# Patient Record
Sex: MALE | Race: WHITE | NOT HISPANIC OR LATINO | URBAN - METROPOLITAN AREA
[De-identification: names, ages, dates, MRNs, and addresses within clinical notes are randomized per-mention and may not be internally consistent; named-entity substitution may affect disease eponyms.]

---

## 2018-03-31 ENCOUNTER — EMERGENCY (EMERGENCY)
Facility: HOSPITAL | Age: 83
LOS: 1 days | End: 2018-03-31
Payer: MEDICARE

## 2018-03-31 PROCEDURE — 71045 X-RAY EXAM CHEST 1 VIEW: CPT | Mod: 26

## 2018-03-31 PROCEDURE — 70450 CT HEAD/BRAIN W/O DYE: CPT | Mod: 26

## 2018-03-31 PROCEDURE — 99285 EMERGENCY DEPT VISIT HI MDM: CPT

## 2018-06-08 ENCOUNTER — EMERGENCY (EMERGENCY)
Facility: HOSPITAL | Age: 83
LOS: 1 days | End: 2018-06-08
Payer: MEDICARE

## 2018-06-08 PROCEDURE — 99284 EMERGENCY DEPT VISIT MOD MDM: CPT

## 2018-06-08 PROCEDURE — 72131 CT LUMBAR SPINE W/O DYE: CPT | Mod: 26

## 2019-02-05 ENCOUNTER — INPATIENT (INPATIENT)
Facility: HOSPITAL | Age: 84
LOS: 4 days | Discharge: HOME | End: 2019-02-10
Attending: NEUROLOGICAL SURGERY | Admitting: NEUROLOGICAL SURGERY
Payer: MEDICARE

## 2019-02-05 VITALS
HEART RATE: 89 BPM | WEIGHT: 166.01 LBS | HEIGHT: 60.6 IN | DIASTOLIC BLOOD PRESSURE: 83 MMHG | OXYGEN SATURATION: 97 % | TEMPERATURE: 97 F | RESPIRATION RATE: 17 BRPM | SYSTOLIC BLOOD PRESSURE: 120 MMHG

## 2019-02-05 DIAGNOSIS — I10 ESSENTIAL (PRIMARY) HYPERTENSION: ICD-10-CM

## 2019-02-05 DIAGNOSIS — S06.5X9A TRAUMATIC SUBDURAL HEMORRHAGE WITH LOSS OF CONSCIOUSNESS OF UNSPECIFIED DURATION, INITIAL ENCOUNTER: ICD-10-CM

## 2019-02-05 DIAGNOSIS — M10.9 GOUT, UNSPECIFIED: ICD-10-CM

## 2019-02-05 DIAGNOSIS — N40.0 BENIGN PROSTATIC HYPERPLASIA WITHOUT LOWER URINARY TRACT SYMPTOMS: ICD-10-CM

## 2019-02-05 DIAGNOSIS — S06.5X0A TRAUMATIC SUBDURAL HEMORRHAGE WITHOUT LOSS OF CONSCIOUSNESS, INITIAL ENCOUNTER: ICD-10-CM

## 2019-02-05 DIAGNOSIS — Z96.649 PRESENCE OF UNSPECIFIED ARTIFICIAL HIP JOINT: Chronic | ICD-10-CM

## 2019-02-05 PROBLEM — Z00.00 ENCOUNTER FOR PREVENTIVE HEALTH EXAMINATION: Status: ACTIVE | Noted: 2019-02-05

## 2019-02-05 PROCEDURE — 93010 ELECTROCARDIOGRAM REPORT: CPT

## 2019-02-05 RX ORDER — TAMSULOSIN HYDROCHLORIDE 0.4 MG/1
0.4 CAPSULE ORAL AT BEDTIME
Qty: 0 | Refills: 0 | Status: DISCONTINUED | OUTPATIENT
Start: 2019-02-05 | End: 2019-02-10

## 2019-02-05 RX ORDER — ALLOPURINOL 300 MG
1 TABLET ORAL
Qty: 0 | Refills: 0 | COMMUNITY

## 2019-02-05 RX ORDER — LEVETIRACETAM 250 MG/1
500 TABLET, FILM COATED ORAL
Qty: 0 | Refills: 0 | Status: DISCONTINUED | OUTPATIENT
Start: 2019-02-05 | End: 2019-02-10

## 2019-02-05 RX ORDER — ALLOPURINOL 300 MG
300 TABLET ORAL DAILY
Qty: 0 | Refills: 0 | Status: DISCONTINUED | OUTPATIENT
Start: 2019-02-05 | End: 2019-02-10

## 2019-02-05 RX ORDER — ACETAMINOPHEN 500 MG
650 TABLET ORAL EVERY 6 HOURS
Qty: 0 | Refills: 0 | Status: DISCONTINUED | OUTPATIENT
Start: 2019-02-05 | End: 2019-02-07

## 2019-02-05 RX ORDER — AMLODIPINE BESYLATE 2.5 MG/1
5 TABLET ORAL DAILY
Qty: 0 | Refills: 0 | Status: DISCONTINUED | OUTPATIENT
Start: 2019-02-05 | End: 2019-02-05

## 2019-02-05 RX ORDER — AMLODIPINE BESYLATE 2.5 MG/1
1 TABLET ORAL
Qty: 0 | Refills: 0 | COMMUNITY

## 2019-02-05 RX ORDER — TAMSULOSIN HYDROCHLORIDE 0.4 MG/1
1 CAPSULE ORAL
Qty: 0 | Refills: 0 | COMMUNITY

## 2019-02-05 RX ORDER — AMLODIPINE BESYLATE 2.5 MG/1
5 TABLET ORAL DAILY
Qty: 0 | Refills: 0 | Status: DISCONTINUED | OUTPATIENT
Start: 2019-02-05 | End: 2019-02-10

## 2019-02-05 RX ORDER — ATORVASTATIN CALCIUM 80 MG/1
80 TABLET, FILM COATED ORAL AT BEDTIME
Qty: 0 | Refills: 0 | Status: DISCONTINUED | OUTPATIENT
Start: 2019-02-05 | End: 2019-02-10

## 2019-02-05 RX ADMIN — TAMSULOSIN HYDROCHLORIDE 0.4 MILLIGRAM(S): 0.4 CAPSULE ORAL at 22:42

## 2019-02-05 RX ADMIN — LEVETIRACETAM 500 MILLIGRAM(S): 250 TABLET, FILM COATED ORAL at 22:42

## 2019-02-05 NOTE — H&P ADULT - REASON FOR ADMISSION
Transfer from [facility] for subacute on chronic subdural hemtoma s/p fall 1 week ago Transfer from [facility] for subacute on chronic subdural hematoma s/p fall 1 week ago

## 2019-02-05 NOTE — H&P ADULT - PROBLEM SELECTOR PLAN 2
1. Will restart Flomax  2. MRI Thoracic spine incidentally showed 1.4cm Bladder calculi- Will obtain Bladder US and possible Urology consult in AM 1. Will restart Flomax  2. MRI Thoracic spine incidentally showed 1.4cm Bladder calculi- Per family and patient- this has been known about for years. Last Urology visit was last week who recommended follow up in 1 months.

## 2019-02-05 NOTE — H&P ADULT - PMH
BPH (benign prostatic hyperplasia)    Essential hypertension    Gout    Hyperlipidemia    Lymphoma in remission  Tonsillar large cell lymphoma in remission since 2015. [place] [facility] [place]

## 2019-02-05 NOTE — PATIENT PROFILE ADULT - NSPROMEDSPATCH_GEN_A_NUR
medication patch(es) used/Pt adm from [facility] with Lidoderm patch on R-shoulder

## 2019-02-05 NOTE — H&P ADULT - NSHPLABSRESULTS_GEN_ALL_CORE
[facility]ert[facility]:  Uric Acid 6.6 (normal)  Platelets 237 (Normal)      [facility]:    CT Cervical spine: Multilevel degenerative changes, formainal narrowing @ C5/6, C6/7, No definite fracture. Prominent right thyroid    Right Shoulder Xray: Calcification superior to Acromioclavicular joint possible Bursitis, No fracture or dislocation    CTH- see above    MRI brain: Right sided subdural hematoma  2.0cm, 3.6mm Leftward shift, sulcal effacement on the right  No infarct  Pansinusitis- Extensive maxillary sinus opacification, Left frontal sinus opacification    MRI Lumbar spine: Multilevel disc disease post prominent at L3-4, L4-5    MRI Thoracic spine: No fracture, Incidental signal intensity within bladder, suggestive   of bladder calculus with diameter of 1.8cm    MRI Cervical spine: Flattening of thecal sac @ C4-5 with L sided foraminal stenosis

## 2019-02-05 NOTE — H&P ADULT - ASSESSMENT
83 year old Male s/p recent fall with subacute on chronic right sided subdural hematoma with 3.6mm shift on imaging from [facility]

## 2019-02-05 NOTE — H&P ADULT - PROBLEM SELECTOR PLAN 1
1. Neuro checks q 4 hours  2. Will upload all CT and MRI imaging  3. Will allow diet for now 1. Neuro checks q 4 hours  2. Will upload all CT and MRI imaging- Will review MRI cervical spine with neuro rads given myelomalacia  3. Will allow diet for now  4.

## 2019-02-05 NOTE — H&P ADULT - HISTORY OF PRESENT ILLNESS
83 year old Male with medical history of OA, BPH, Gout, HTN, HLD, Tonsilar large cell lymphoma (Treated with chemotherapy @ [place] and in remission since 2015), R hip replacement, presented to [facility] on 2/4 complaining of unsteady gait after falling about 1 week ago.     Around 1 month ago, patient was having difficulty walking with gait wide based. Patient has had 2 falls in the last month, most recent was one week ago while walking to his car. Patient does not remember if he hit his head. During this fall, he injured his right arm and right side of the neck. [facility].     Of note, patient recently stopped his Lipitor (80mg)    [facility] Cervical spine negative for fracture, CT head demonstrated Right frontoparietal subdural collection with subacute to chronic hemorrhagic products measuring 2.2cm with 3.6cm of midline shift. Neurosurgery contacted at [facility] who suggested admission to ICU for neuro checks q 1 hour and possibly transfer to [facility] on 2/5. Patient was referred to Dr. Moses for consulted who [facility]    Patient denies taking Aspirin or any other blood thinner 83 year old Male with medical history of OA, BPH, Gout, HTN, HLD, Tonsilar large cell lymphoma (Treated with chemotherapy @ [place] and in remission since 2015), R hip replacement, presented to [facility] on  complaining of unsteady gait after falling about 1 week ago.     Around 1 month ago, patient was having difficulty walking with gait wide based. Patient has had 2 falls in the last month, most recent was one week ago while walking to his car. Patient does not remember if he hit his head. During this fall, he injured his right arm and right side of the neck. Patient presented to [facility] for the shoulder pain and neck pain.     Of note, patient recently stopped his Lipitor (80mg)    In [facility] ER. CT Cervical spine negative for fracture, CT head demonstrated Right frontoparietal subdural collection with subacute to chronic hemorrhagic products measuring 2.2cm with 3.6cm of midline shift. Neurosurgery contacted at [facility] who suggested admission to ICU for neuro checks q 1 hour and possibly transfer to [facility] on . Patient was referred to Dr. Moses for consulted who accepted direct admit to [facility]    Patient denies taking Aspirin or any other blood thinner    CAPRINI SCORE [CLOT]    AGE RELATED RISK FACTORS                                                       MOBILITY RELATED FACTORS  [ ] Age 41-60 years                                            (1 Point)                  [ ] Bed rest                                                        (1 Point)  [ ] Age: 61-74 years                                           (2 Points)                 [ ] Plaster cast                                                   (2 Points)  [x ] Age= 75 years                                              (3 Points)                 [ ] Bed bound for more than 72 hours                 (2 Points)    DISEASE RELATED RISK FACTORS                                               GENDER SPECIFIC FACTORS  [ ] Edema in the lower extremities                       (1 Point)                  [ ] Pregnancy                                                     (1 Point)  [ ] Varicose veins                                               (1 Point)                  [ ] Post-partum < 6 weeks                                   (1 Point)             [ x] BMI > 25 Kg/m2                                            (1 Point)                  [ ] Hormonal therapy  or oral contraception          (1 Point)                 [ ] Sepsis (in the previous month)                        (1 Point)                  [ ] History of pregnancy complications                 (1 point)  [ ] Pneumonia or serious lung disease                                               [ ] Unexplained or recurrent                     (1 Point)           (in the previous month)                               (1 Point)  [ ] Abnormal pulmonary function test                     (1 Point)                 SURGERY RELATED RISK FACTORS  [ ] Acute myocardial infarction                              (1 Point)                 [ ]  Section                                             (1 Point)  [ ] Congestive heart failure (in the previous month)  (1 Point)               [ ] Minor surgery                                                  (1 Point)   [ ] Inflammatory bowel disease                             (1 Point)                 [ ] Arthroscopic surgery                                        (2 Points)  [ ] Central venous access                                      (2 Points)                [ ] General surgery lasting more than 45 minutes   (2 Points)       [ ] Stroke (in the previous month)                          (5 Points)               [ ] Elective arthroplasty                                         (5 Points)                                                                                                                                               HEMATOLOGY RELATED FACTORS                                                 TRAUMA RELATED RISK FACTORS  [ ] Prior episodes of VTE                                     (3 Points)                [ ] Fracture of the hip, pelvis, or leg                       (5 Points)  [ ] Positive family history for VTE                         (3 Points)                 [ ] Acute spinal cord injury (in the previous month)  (5 Points)  [ ] Prothrombin 99187 A                                     (3 Points)                 [ ] Paralysis  (less than 1 month)                             (5 Points)  [ ] Factor V Leiden                                             (3 Points)                  [ ] Multiple Trauma within 1 month                        (5 Points)  [ ] Lupus anticoagulants                                     (3 Points)                                                           [ ] Anticardiolipin antibodies                               (3 Points)                                                       [ ] High homocysteine in the blood                      (3 Points)                                             [ ] Other congenital or acquired thrombophilia      (3 Points)                                                [ ] Heparin induced thrombocytopenia                  (3 Points)                                          Total Score [      4    ]

## 2019-02-05 NOTE — PATIENT PROFILE ADULT - ABILITY TO HEAR (WITH HEARING AID OR HEARING APPLIANCE IF NORMALLY USED):
NO hearing aid/Mildly to Moderately Impaired: difficulty hearing in some environments or speaker may need to increase volume or speak distinctly

## 2019-02-05 NOTE — H&P ADULT - NSHPPHYSICALEXAM_GEN_ALL_CORE
Awake Alert Oriented x 3  PERRL  Head: Normal cephalic Atraumatic  EOMI, Tongue midline  Motor 5/5 in all extremities  Sensation intact  No Clonus  No lowery's  No babinski Awake Alert Oriented x 3  PERRL  Head: Normal cephalic Atraumatic  EOMI, Tongue midline  Motor 5/5 in all extremities except RUE deltoid limited due to shoulder pain  Sensation intact  No Clonus  No lowery's  No babinski  Hyperreflexic on

## 2019-02-06 ENCOUNTER — TRANSCRIPTION ENCOUNTER (OUTPATIENT)
Age: 84
End: 2019-02-06

## 2019-02-06 DIAGNOSIS — E78.5 HYPERLIPIDEMIA, UNSPECIFIED: ICD-10-CM

## 2019-02-06 DIAGNOSIS — Z29.9 ENCOUNTER FOR PROPHYLACTIC MEASURES, UNSPECIFIED: ICD-10-CM

## 2019-02-06 DIAGNOSIS — Z01.818 ENCOUNTER FOR OTHER PREPROCEDURAL EXAMINATION: ICD-10-CM

## 2019-02-06 DIAGNOSIS — C85.90 NON-HODGKIN LYMPHOMA, UNSPECIFIED, UNSPECIFIED SITE: ICD-10-CM

## 2019-02-06 LAB
ANION GAP SERPL CALC-SCNC: 14 MMO/L — SIGNIFICANT CHANGE UP (ref 7–14)
APTT BLD: 31.7 SEC — SIGNIFICANT CHANGE UP (ref 27.5–36.3)
BLD GP AB SCN SERPL QL: NEGATIVE — SIGNIFICANT CHANGE UP
BUN SERPL-MCNC: 17 MG/DL — SIGNIFICANT CHANGE UP (ref 7–23)
CALCIUM SERPL-MCNC: 9.5 MG/DL — SIGNIFICANT CHANGE UP (ref 8.4–10.5)
CHLORIDE SERPL-SCNC: 103 MMOL/L — SIGNIFICANT CHANGE UP (ref 98–107)
CO2 SERPL-SCNC: 22 MMOL/L — SIGNIFICANT CHANGE UP (ref 22–31)
CREAT SERPL-MCNC: 1.31 MG/DL — HIGH (ref 0.5–1.3)
GLUCOSE SERPL-MCNC: 132 MG/DL — HIGH (ref 70–99)
HCT VFR BLD CALC: 44.7 % — SIGNIFICANT CHANGE UP (ref 39–50)
HGB BLD-MCNC: 15.3 G/DL — SIGNIFICANT CHANGE UP (ref 13–17)
INR BLD: 0.98 — SIGNIFICANT CHANGE UP (ref 0.88–1.17)
MCHC RBC-ENTMCNC: 31 PG — SIGNIFICANT CHANGE UP (ref 27–34)
MCHC RBC-ENTMCNC: 34.2 % — SIGNIFICANT CHANGE UP (ref 32–36)
MCV RBC AUTO: 90.7 FL — SIGNIFICANT CHANGE UP (ref 80–100)
NRBC # FLD: 0 K/UL — LOW (ref 25–125)
PLATELET # BLD AUTO: 223 K/UL — SIGNIFICANT CHANGE UP (ref 150–400)
PMV BLD: 10.1 FL — SIGNIFICANT CHANGE UP (ref 7–13)
POTASSIUM SERPL-MCNC: 3.9 MMOL/L — SIGNIFICANT CHANGE UP (ref 3.5–5.3)
POTASSIUM SERPL-SCNC: 3.9 MMOL/L — SIGNIFICANT CHANGE UP (ref 3.5–5.3)
PROTHROM AB SERPL-ACNC: 10.9 SEC — SIGNIFICANT CHANGE UP (ref 9.8–13.1)
RBC # BLD: 4.93 M/UL — SIGNIFICANT CHANGE UP (ref 4.2–5.8)
RBC # FLD: 12.9 % — SIGNIFICANT CHANGE UP (ref 10.3–14.5)
RH IG SCN BLD-IMP: NEGATIVE — SIGNIFICANT CHANGE UP
RH IG SCN BLD-IMP: NEGATIVE — SIGNIFICANT CHANGE UP
SODIUM SERPL-SCNC: 139 MMOL/L — SIGNIFICANT CHANGE UP (ref 135–145)
URATE SERPL-MCNC: 6.6 MG/DL — SIGNIFICANT CHANGE UP (ref 3.4–8.8)
WBC # BLD: 7.45 K/UL — SIGNIFICANT CHANGE UP (ref 3.8–10.5)
WBC # FLD AUTO: 7.45 K/UL — SIGNIFICANT CHANGE UP (ref 3.8–10.5)

## 2019-02-06 PROCEDURE — 99223 1ST HOSP IP/OBS HIGH 75: CPT

## 2019-02-06 PROCEDURE — 71045 X-RAY EXAM CHEST 1 VIEW: CPT | Mod: 26

## 2019-02-06 PROCEDURE — 93010 ELECTROCARDIOGRAM REPORT: CPT

## 2019-02-06 PROCEDURE — 70450 CT HEAD/BRAIN W/O DYE: CPT | Mod: 26

## 2019-02-06 RX ADMIN — Medication 650 MILLIGRAM(S): at 05:39

## 2019-02-06 RX ADMIN — LEVETIRACETAM 500 MILLIGRAM(S): 250 TABLET, FILM COATED ORAL at 05:39

## 2019-02-06 RX ADMIN — Medication 300 MILLIGRAM(S): at 15:09

## 2019-02-06 RX ADMIN — TAMSULOSIN HYDROCHLORIDE 0.4 MILLIGRAM(S): 0.4 CAPSULE ORAL at 21:10

## 2019-02-06 RX ADMIN — LEVETIRACETAM 500 MILLIGRAM(S): 250 TABLET, FILM COATED ORAL at 18:08

## 2019-02-06 NOTE — PROGRESS NOTE ADULT - SUBJECTIVE AND OBJECTIVE BOX
Surgery: right sided diallo hole for evacuation of SDH, possible craniotomy   Consent to be signed by patient                    Alleriges: Motrin (Rash)    T(C): 36.4 (02-06-19 @ 05:33), Max: 36.8 (02-06-19 @ 01:12)  HR: 74 (02-06-19 @ 05:33) (74 - 89)  BP: 109/50 (02-06-19 @ 05:33) (109/50 - 133/69)  RR: 17 (02-06-19 @ 05:33) (17 - 18)  SpO2: 97% (02-06-19 @ 05:33) (97% - 97%)  Wt(kg): --    AAOx3, MAEx4, unsteady gait, no drift, EOMI, PERRL    139  |  103  |  17  ----------------------------<  132<H>  3.9   |  22  |  1.31<H>    Ca    9.5      06 Feb 2019 05:30    CBC Full  -  ( 06 Feb 2019 05:30 )  WBC Count : 7.45 K/uL  Hemoglobin : 15.3 g/dL  Hematocrit : 44.7 %  Platelet Count - Automated : 223 K/uL  Mean Cell Volume : 90.7 fL  Mean Cell Hemoglobin : 31.0 pg  Mean Cell Hemoglobin Concentration : 34.2 %              PT/INR - ( 06 Feb 2019 05:30 )   PT: 10.9 SEC;   INR: 0.98     PTT - ( 06 Feb 2019 05:30 )  PTT:31.7 SEC    Other Clearances:   pending medical clearance

## 2019-02-06 NOTE — CONSULT NOTE ADULT - PROBLEM SELECTOR RECOMMENDATION 9
Patient with RBBB on EKG but old [facility] on [facility], no signs of active ischemia, CP, SOB, or decompensated CHF  -RCRI score=0  -Patient is medically optimized and may proceed to OR without further work-up  -Would consider anesthesia evaluation prior to surgery given patient's need for NGT decompression post-op and significant agitation post-op to determine if general anesthesia is required

## 2019-02-06 NOTE — CONSULT NOTE ADULT - ASSESSMENT
84 yo M with HTN, HLD, tonsilar large cell lymphoma (Treated with chemotherapy @ [place] and in remission since 2015), R hip replacement, gout, OA [facility] complaining of unsteady gait after falling about 1 week ago and subsequently found to have a right frontal parietal subdural hematoma with right to left midline shift with plan for Berince hole in AM.

## 2019-02-06 NOTE — PROGRESS NOTE ADULT - ASSESSMENT
83 year old M s/p fall 1 week ago p/w unsteady gait, Right shoulder pain, Transferred to [facility] after CT scan demonstrated Right subacute on chronic subdural hematoma and MRI cervical spine with myelomalacia

## 2019-02-06 NOTE — CONSULT NOTE ADULT - SUBJECTIVE AND OBJECTIVE BOX
CHIEF COMPLAINT: Patient is a 83y old  Male who presents with a chief complaint of Transfer from [facility] for subacute on chronic subdural hematoma s/p fall 1 week ago (06 Feb 2019 02:22)      HPI: HPI:  83 year old Male with medical history of OA, BPH, Gout, HTN, HLD, Tonsilar large cell lymphoma (Treated with chemotherapy @ [place] and in remission since 2015), R hip replacement, presented to [facility] on 2/4 complaining of unsteady gait after falling about 1 week ago.     Around 1 month ago, patient was having difficulty walking with gait wide based. Patient has had 2 falls in the last month, most recent was one week ago while walking to his car. Patient does not remember if he hit his head. During this fall, he injured his right arm and right side of the neck. Patient presented to [facility] for the shoulder pain and neck pain.     Of note, patient recently stopped his Lipitor (80mg)    [facility] Cervical spine negative for fracture, CT head demonstrated Right frontoparietal subdural collection with subacute to chronic hemorrhagic products measuring 2.2cm with 3.6cm of midline shift. Neurosurgery contacted at [facility] who suggested admission to ICU for neuro checks q 1 hour and possibly transfer to [facility] on 2/5. Patient was referred to Dr. Moses for consulted who accepted direct admit to [facility]    Patient denies taking Aspirin or any other blood thinner  -----------------------------------------------------------------------------    Additional history obtained from patient, wife, and son at bedside. Per family, prior to patient's difficulty with ambulation, he was a very active individual, "always running around, climbing ladders, always doing something." He reports he was able to climb a ladder and a flight of steps without difficulty and no CP, SOB, palpitations, CORTES. He reports that approximately 20 years ago, there was concern for an ischemic cardiac event at work, he was brought to [place], cardiac enzymes were negative, and cardiac cath was completely normal. Since then, he has had no issues with his heart.    Of note, patient underwent hip replacement surgery approximately 10 years ago. There was no difficulty removing him from the vent, but patient reportedly was very agitated after general anesthesia and his abdomen was found to be grossly distended immediately post-op requiring NGT decompression for approximately one day. Of note, 10 days post-operatively, the patient reports that when he tried to get out of bed, he had an episode of vasovagal syncope. No cardiac etiology was found. Patient does have a history of HTN and HLD. I [facility] who reviewed his EKG (and will send a copy) revealing an old RBBB. At present, patient has no CP, SOB, palpitations, dizziness, LE edema.    Allergies: Motrin (Rash), Aspirin (Rash)  Intolerances:        HOME MEDICATIONS: [x] Reviewed    MEDICATIONS  (STANDING):  allopurinol 300 milliGRAM(s) Oral daily  amLODIPine   Tablet 5 milliGRAM(s) Oral daily  atorvastatin 80 milliGRAM(s) Oral at bedtime  levETIRAcetam 500 milliGRAM(s) Oral two times a day  tamsulosin 0.4 milliGRAM(s) Oral at bedtime    MEDICATIONS  (PRN):  acetaminophen   Tablet .. 650 milliGRAM(s) Oral every 6 hours PRN Mild Pain (1 - 3)      PAST MEDICAL & SURGICAL HISTORY:  Subdural hematoma  Lymphoma in remission: Tonsillar large cell lymphoma in remission since 2015. Treatment @ [facility]  BPH (benign prostatic hyperplasia)  Bladder stones  Gout  Hyperlipidemia  Essential hypertension  History of hip replacement  [x] Reviewed     SOCIAL HISTORY:  [x] Substance abuse: Denies  [x] Tobacco: Quit 50 yrs ago, 0.25 cigarettes/day x ~5 years  [x] Alcohol use: Occasional glass of wine weekly    FAMILY HISTORY:  [x] No pertinent family history in first degree relatives     REVIEW OF SYSTEMS:    [x] All other ROS negative  [  ] Unable to obtain due to poor mental status    Vital Signs Last 24 Hrs  T(C): 36.3 (06 Feb 2019 10:27), Max: 36.8 (06 Feb 2019 01:12)  T(F): 97.4 (06 Feb 2019 10:27), Max: 98.2 (06 Feb 2019 01:12)  HR: 62 (06 Feb 2019 10:27) (62 - 89)  BP: 106/56 (06 Feb 2019 10:27) (106/56 - 133/69)  BP(mean): --  RR: 17 (06 Feb 2019 10:27) (17 - 18)  SpO2: 96% (06 Feb 2019 10:27) (96% - 97%)    PHYSICAL EXAM:    GENERAL: NAD, well-groomed, well-developed  HEAD:  Atraumatic, Normocephalic  EYES:  PERRLA, conjunctiva and sclera clear  ENMT: Moist mucous membranes  NECK: Supple, No JVD  RESPIRATORY: Clear to auscultation bilaterally; No rales, rhonchi, wheezing  CARDIOVASCULAR: Regular rate and rhythm; No murmurs,   GASTROINTESTINAL: Soft, Nontender, Nondistended; Bowel sounds present  GENITOURINARY: Not examined  EXTREMITIES:  2+ Peripheral Pulses, No clubbing, cyanosis, or edema  NERVOUS SYSTEM:  Alert & Oriented X3; Moving all 4 extremities; No gross sensory deficits  HEME/LYMPH: No lymphadenopathy noted  SKIN: Nasal lesion    LABS:                        15.3   7.45  )-----------( 223      ( 06 Feb 2019 05:30 )             44.7     02-06    139  |  103  |  17  ----------------------------<  132<H>  3.9   |  22  |  1.31<H>    Ca    9.5      06 Feb 2019 05:30    [facility] records: Cr was 1.3 on 2/4/19 and 1.2 in 10/18    PT/INR - ( 06 Feb 2019 05:30 )   PT: 10.9 SEC;   INR: 0.98          PTT - ( 06 Feb 2019 05:30 )  PTT:31.7 SEC    CAPILLARY BLOOD GLUCOSE          RADIOLOGY & ADDITIONAL STUDIES:    EKG:   Personally Reviewed:  [x] YES   NSR, RBBB noted (old per discussion with [facility]), no acute ischemic changes appreciated      Imaging:   Personally Reviewed:  [x] YES   < from: CT Head No Cont (02.06.19 @ 11:18) >  IMPRESSION:    The right frontal parietal subdural hematoma and associated right to left   midline shift are stable compared to the outside head CT study from   02/04/2019.    < end of copied text >                [x] Consultant(s) Notes Reviewed  [x] Care Discussed with Consultants/Other Providers: Neurosurgery PA-Plan of care CHIEF COMPLAINT: Patient is a 83y old  Male who presents with a chief complaint of Transfer from [facility] for subacute on chronic subdural hematoma s/p fall 1 week ago (06 Feb 2019 02:22)      HPI: HPI:  83 year old Male with medical history of OA, BPH, Gout, HTN, HLD, Tonsilar large cell lymphoma (Treated with chemotherapy @ [place] and in remission since 2015), R hip replacement, presented to [facility] on 2/4 complaining of unsteady gait after falling about 1 week ago.     Around 1 month ago, patient was having difficulty walking with gait wide based. Patient has had 2 falls in the last month, most recent was one week ago while walking to his car. Patient does not remember if he hit his head. During this fall, he injured his right arm and right side of the neck. Patient presented to [facility] for the shoulder pain and neck pain.     Of note, patient recently stopped his Lipitor (80mg)    [facility] Cervical spine negative for fracture, CT head demonstrated Right frontoparietal subdural collection with subacute to chronic hemorrhagic products measuring 2.2cm with 3.6cm of midline shift. Neurosurgery contacted at [facility] who suggested admission to ICU for neuro checks q 1 hour and possibly transfer to [facility] on 2/5. Patient was referred to Dr. Moses for consulted who accepted direct admit to [facility]    Patient denies taking Aspirin or any other blood thinner  -----------------------------------------------------------------------------    Additional history obtained from patient, wife, and son at bedside. Per family, prior to patient's difficulty with ambulation, he was a very active individual, "always running around, climbing ladders, always doing something." He reports he was able to climb a ladder and a flight of steps without difficulty and no CP, SOB, palpitations, CORTES. He reports that approximately 20 years ago, there was concern for an ischemic cardiac event at work, he was brought to [place], cardiac enzymes were negative, and cardiac cath was completely normal. Since then, he has had no issues with his heart.    Of note, patient underwent hip replacement surgery approximately 10 years ago. There was no difficulty removing him from the vent, but patient reportedly was very agitated after general anesthesia and his abdomen was found to be grossly distended immediately post-op requiring NGT decompression for approximately one day. Also of note, 10 days post-operatively, the patient reports that when he tried to get out of bed, he had an episode of vasovagal syncope. No cardiac etiology was found. Patient does have a history of HTN and HLD. I spoke with [facility] who reviewed his EKG (and will send a copy) revealing an old RBBB. At present, patient has no CP, SOB, palpitations, dizziness, LE edema.    Allergies: Motrin (Rash), Aspirin (Rash)  Intolerances:        HOME MEDICATIONS: [x] Reviewed    MEDICATIONS  (STANDING):  allopurinol 300 milliGRAM(s) Oral daily  amLODIPine   Tablet 5 milliGRAM(s) Oral daily  atorvastatin 80 milliGRAM(s) Oral at bedtime  levETIRAcetam 500 milliGRAM(s) Oral two times a day  tamsulosin 0.4 milliGRAM(s) Oral at bedtime    MEDICATIONS  (PRN):  acetaminophen   Tablet .. 650 milliGRAM(s) Oral every 6 hours PRN Mild Pain (1 - 3)      PAST MEDICAL & SURGICAL HISTORY:  Subdural hematoma  Lymphoma in remission: Tonsillar large cell lymphoma in remission since 2015. Treatment @ [facility]  BPH (benign prostatic hyperplasia)  Bladder stones  Gout  Hyperlipidemia  Essential hypertension  History of hip replacement  [x] Reviewed     SOCIAL HISTORY:  [x] Substance abuse: Denies  [x] Tobacco: Quit 50 yrs ago, 0.25 cigarettes/day x ~5 years  [x] Alcohol use: Occasional glass of wine weekly    FAMILY HISTORY:  [x] No pertinent family history in first degree relatives     REVIEW OF SYSTEMS:    [x] All other ROS negative  [  ] Unable to obtain due to poor mental status    Vital Signs Last 24 Hrs  T(C): 36.3 (06 Feb 2019 10:27), Max: 36.8 (06 Feb 2019 01:12)  T(F): 97.4 (06 Feb 2019 10:27), Max: 98.2 (06 Feb 2019 01:12)  HR: 62 (06 Feb 2019 10:27) (62 - 89)  BP: 106/56 (06 Feb 2019 10:27) (106/56 - 133/69)  BP(mean): --  RR: 17 (06 Feb 2019 10:27) (17 - 18)  SpO2: 96% (06 Feb 2019 10:27) (96% - 97%)    PHYSICAL EXAM:    GENERAL: NAD, well-groomed, well-developed  HEAD:  Atraumatic, Normocephalic  EYES:  PERRLA, conjunctiva and sclera clear  ENMT: Moist mucous membranes  NECK: Supple, No JVD  RESPIRATORY: Clear to auscultation bilaterally; No rales, rhonchi, wheezing  CARDIOVASCULAR: Regular rate and rhythm; No murmurs,   GASTROINTESTINAL: Soft, Nontender, Nondistended; Bowel sounds present  GENITOURINARY: Not examined  EXTREMITIES:  2+ Peripheral Pulses, No clubbing, cyanosis, or edema  NERVOUS SYSTEM:  Alert & Oriented X3; Moving all 4 extremities; No gross sensory deficits  HEME/LYMPH: No lymphadenopathy noted  SKIN: Nasal lesion    LABS:                        15.3   7.45  )-----------( 223      ( 06 Feb 2019 05:30 )             44.7     02-06    139  |  103  |  17  ----------------------------<  132<H>  3.9   |  22  |  1.31<H>    Ca    9.5      06 Feb 2019 05:30    [facility]: Cr was 1.3 on 2/4/19 and 1.2 in 10/18    PT/INR - ( 06 Feb 2019 05:30 )   PT: 10.9 SEC;   INR: 0.98          PTT - ( 06 Feb 2019 05:30 )  PTT:31.7 SEC    CAPILLARY BLOOD GLUCOSE          RADIOLOGY & ADDITIONAL STUDIES:    EKG:   Personally Reviewed:  [x] YES   NSR, RBBB noted (old per discussion with [facility]), no acute ischemic changes appreciated      Imaging:   Personally Reviewed:  [x] YES   < from: CT Head No Cont (02.06.19 @ 11:18) >  IMPRESSION:    The right frontal parietal subdural hematoma and associated right to left   midline shift are stable compared to the outside head CT study from   02/04/2019.    < end of copied text >                [x] Consultant(s) Notes Reviewed  [x] Care Discussed with Consultants/Other Providers: Neurosurgery PA-Plan of care

## 2019-02-06 NOTE — CONSULT NOTE ADULT - PROBLEM SELECTOR RECOMMENDATION 2
Plan for OR in AM for Bernice Hole with possible craniotomy  -CT head reveals stable SDH  -c/w Keppra as per neurosurgery  -Further management per Neurosurgery

## 2019-02-06 NOTE — PROGRESS NOTE ADULT - SUBJECTIVE AND OBJECTIVE BOX
OVERNIGHT EVENTS: Combative with nursing staff after nurse attempted to aide patient in getting out of bed after witnessing the patient be unsteady       Vital Signs Last 24 Hrs  T(C): 36.8 (06 Feb 2019 01:12), Max: 36.8 (06 Feb 2019 01:12)  T(F): 98.2 (06 Feb 2019 01:12), Max: 98.2 (06 Feb 2019 01:12)  HR: 83 (06 Feb 2019 01:12) (83 - 89)  BP: 133/69 (06 Feb 2019 01:12) (120/83 - 133/69)  BP(mean): --  RR: 18 (06 Feb 2019 01:12) (17 - 18)  SpO2: 97% (06 Feb 2019 01:12) (97% - 97%)    PHYSICAL EXAM:  Mental Staus: Awake, Alert, Attentive Oriented x 3  Cranial Nerves: Normal bilaterally  Motor:  Intact  Tone: Normal  Strength: LUE 5/5, no drift, RUE distally 5/5, proximally 4/5 secondary to right shoulder pain  Reflexes: Normal in all extremities ?hyperreflexia in RUE  Negative lowery's  No clonus, Normal plantar reflex  Sensation intact to light touch      MEDICATIONS:  Antibiotics:    Neuro:  acetaminophen   Tablet .. 650 milliGRAM(s) Oral every 6 hours PRN  levETIRAcetam 500 milliGRAM(s) Oral two times a day    Anticoagulation    OTHER:  allopurinol 300 milliGRAM(s) Oral daily  amLODIPine   Tablet 5 milliGRAM(s) Oral daily  atorvastatin 80 milliGRAM(s) Oral at bedtime  tamsulosin 0.4 milliGRAM(s) Oral at bedtime    IVF:        DVT PROPHYLAXIS:  [] Venodynes                                [] Heparin/Lovenox    RADIOLOGY & ADDITIONAL TESTS:

## 2019-02-07 LAB — GLUCOSE BLDC GLUCOMTR-MCNC: 109 MG/DL — HIGH (ref 70–99)

## 2019-02-07 PROCEDURE — 99233 SBSQ HOSP IP/OBS HIGH 50: CPT

## 2019-02-07 PROCEDURE — 99223 1ST HOSP IP/OBS HIGH 75: CPT | Mod: 25

## 2019-02-07 PROCEDURE — 70450 CT HEAD/BRAIN W/O DYE: CPT | Mod: 26

## 2019-02-07 RX ORDER — OXYCODONE AND ACETAMINOPHEN 5; 325 MG/1; MG/1
1 TABLET ORAL EVERY 4 HOURS
Qty: 0 | Refills: 0 | Status: DISCONTINUED | OUTPATIENT
Start: 2019-02-07 | End: 2019-02-10

## 2019-02-07 RX ORDER — ACETAMINOPHEN 500 MG
650 TABLET ORAL EVERY 6 HOURS
Qty: 0 | Refills: 0 | Status: DISCONTINUED | OUTPATIENT
Start: 2019-02-07 | End: 2019-02-10

## 2019-02-07 RX ORDER — HYDROMORPHONE HYDROCHLORIDE 2 MG/ML
0.5 INJECTION INTRAMUSCULAR; INTRAVENOUS; SUBCUTANEOUS
Qty: 0 | Refills: 0 | Status: DISCONTINUED | OUTPATIENT
Start: 2019-02-07 | End: 2019-02-08

## 2019-02-07 RX ORDER — FENTANYL CITRATE 50 UG/ML
25 INJECTION INTRAVENOUS
Qty: 0 | Refills: 0 | Status: DISCONTINUED | OUTPATIENT
Start: 2019-02-07 | End: 2019-02-08

## 2019-02-07 RX ORDER — ACETAMINOPHEN 500 MG
1000 TABLET ORAL ONCE
Qty: 0 | Refills: 0 | Status: COMPLETED | OUTPATIENT
Start: 2019-02-07 | End: 2019-02-07

## 2019-02-07 RX ORDER — SODIUM CHLORIDE 9 MG/ML
1000 INJECTION INTRAMUSCULAR; INTRAVENOUS; SUBCUTANEOUS
Qty: 0 | Refills: 0 | Status: DISCONTINUED | OUTPATIENT
Start: 2019-02-07 | End: 2019-02-08

## 2019-02-07 RX ORDER — SODIUM CHLORIDE 9 MG/ML
1000 INJECTION, SOLUTION INTRAVENOUS
Qty: 0 | Refills: 0 | Status: DISCONTINUED | OUTPATIENT
Start: 2019-02-07 | End: 2019-02-07

## 2019-02-07 RX ORDER — ONDANSETRON 8 MG/1
4 TABLET, FILM COATED ORAL ONCE
Qty: 0 | Refills: 0 | Status: DISCONTINUED | OUTPATIENT
Start: 2019-02-07 | End: 2019-02-08

## 2019-02-07 RX ORDER — CEFAZOLIN SODIUM 1 G
2000 VIAL (EA) INJECTION EVERY 8 HOURS
Qty: 0 | Refills: 0 | Status: COMPLETED | OUTPATIENT
Start: 2019-02-08 | End: 2019-02-08

## 2019-02-07 RX ADMIN — SODIUM CHLORIDE 75 MILLILITER(S): 9 INJECTION, SOLUTION INTRAVENOUS at 05:01

## 2019-02-07 RX ADMIN — Medication 400 MILLIGRAM(S): at 19:30

## 2019-02-07 RX ADMIN — Medication 1000 MILLIGRAM(S): at 20:14

## 2019-02-07 RX ADMIN — TAMSULOSIN HYDROCHLORIDE 0.4 MILLIGRAM(S): 0.4 CAPSULE ORAL at 21:11

## 2019-02-07 RX ADMIN — SODIUM CHLORIDE 60 MILLILITER(S): 9 INJECTION INTRAMUSCULAR; INTRAVENOUS; SUBCUTANEOUS at 19:01

## 2019-02-07 RX ADMIN — SODIUM CHLORIDE 75 MILLILITER(S): 9 INJECTION, SOLUTION INTRAVENOUS at 08:52

## 2019-02-07 RX ADMIN — LEVETIRACETAM 500 MILLIGRAM(S): 250 TABLET, FILM COATED ORAL at 05:01

## 2019-02-07 RX ADMIN — ATORVASTATIN CALCIUM 80 MILLIGRAM(S): 80 TABLET, FILM COATED ORAL at 21:11

## 2019-02-07 RX ADMIN — AMLODIPINE BESYLATE 5 MILLIGRAM(S): 2.5 TABLET ORAL at 05:01

## 2019-02-07 NOTE — PROGRESS NOTE ADULT - PROBLEM SELECTOR PLAN 7
Currently in remission. Patient reports he will be following up at [facility] in a few months  -Outpatient f/u

## 2019-02-07 NOTE — CONSULT NOTE ADULT - ASSESSMENT
ASSESSMENT:  83M with chronic SDH s/p diallo hole, evacuation, and PERICO placement; SICU consulted for neurologic monitoring       PLAN:    Neurologic:   - keppra for seizure ppx  - q1 hr neuro checks  - Tylenol, dilaudid, oxy prn for pain  - continue sertaline for anxiety     Respiratory:   - extubated  - currently on NC, saturating 100%  - supplemental oxygen PRN    Cardiovascular:   - continue home amlodipine for HTN  - hemodynamically stable     Gastrointestinal/Nutrition:   - advance diet as tolerated  - monitor GIF    Renal/Genitourinary:   - monitor I&Os  - continue home Flomax  - replete electrolytes PRN     Hematologic:   - H/H stable  - monitor CBC  - hold on chemical VTE ppx for now    Infectious Disease:   - Ancef for ppx while PERICO in place     Tubes/Lines/Drains:  R A-line, grimm     Endocrine:   - continue home lipitor     Disposition: SICU  --------------------------------------------------------------------------------------    Critical Care Diagnoses: SDH, craniotomy ASSESSMENT:  83M with chronic SDH s/p diallo hole, evacuation, and PERICO placement; SICU consulted for neurologic monitoring       PLAN:    Neurologic:   - keppra for seizure ppx  - q1 hr neuro checks  - repeat CTH tonight   - Tylenol, dilaudid, oxy prn for pain  - continue sertaline for anxiety     Respiratory:   - extubated  - currently on NC, saturating 100%  - supplemental oxygen PRN    Cardiovascular:   - continue home amlodipine for HTN  - hemodynamically stable     Gastrointestinal/Nutrition:   - advance diet as tolerated  - monitor GIF    Renal/Genitourinary:   - monitor I&Os  - continue home Flomax  - replete electrolytes PRN     Hematologic:   - H/H stable  - monitor CBC  - hold on chemical VTE ppx for now    Infectious Disease:   - Ancef for ppx while PERICO in place     Tubes/Lines/Drains:  R A-line, grimm     Endocrine:   - continue home lipitor and allopurinol     Disposition: SICU  --------------------------------------------------------------------------------------    Critical Care Diagnoses: SDH, craniotomy

## 2019-02-07 NOTE — CONSULT NOTE ADULT - ATTENDING COMMENTS
83M with chronic SDH s/p diallo hole, evacuation, and PERICO placement; SICU consulted for neurologic monitoring       PLAN:    Neurologic:   - keppra for seizure ppx  - q1 hr neuro checks  - repeat CTH tonight   - Tylenol, dilaudid, oxy prn for pain  - continue sertaline for anxiety     Respiratory:   - extubated  - currently on NC, saturating 100%  - supplemental oxygen PRN    Cardiovascular:   - continue home amlodipine for HTN  - hemodynamically stable     Gastrointestinal/Nutrition:   - advance diet as tolerated  - monitor GIF    Renal/Genitourinary:   - monitor I&Os  - continue home Flomax  - replete electrolytes PRN     Hematologic:   - H/H stable  - monitor CBC  - hold on chemical VTE ppx for now    Infectious Disease:   - Ancef for ppx while PERICO in place     Tubes/Lines/Drains:  R A-line, grimm     Endocrine:   - continue home lipitor and allopurinol     Disposition: SICU  --------------------------------------------------------------------------------------    Critical Care Diagnoses: SDH, craniotomy   The patient is a critical care patient with life threatening hemodynamic and metabolic instability in SICU.  I have personally interviewed when possible and examined the patient, reviewed data and laboratory tests/x-rays and all pertinent electronic images.  I was physically present for the key portions of the evaluation and management (E/M) service provided.   The SICU team has a constant risk benefit analyzes discussion with the primary team, all consultants, House Staff and PA's on all decisions.  These diagnoses are unrelated to the surgical procedure noted above.  I meet with family if needed to get further history, discuss the case and make care decisions for this patient who might not be able to participate.  Time involved in performance of separately billable procedures was not counted toward my critical care time. There is no overlap.  I spent 55-75 minutes of critical care time for the diagnoses, assessment, plan and interventions.

## 2019-02-07 NOTE — PROGRESS NOTE ADULT - SUBJECTIVE AND OBJECTIVE BOX
[facility] Medicine  Pager: [phone]    Patient is a 83y old  Male who presents with a chief complaint of Transfer from [facility] for subacute on chronic subdural hematoma s/p fall 1 week ago (07 Feb 2019 02:24)      SUBJECTIVE / OVERNIGHT EVENTS: No acute events overnight. Denies any CP, SOB, palpitations, light-headedness. Reports he just wants to get the procedure over with so that he can get back to his normally active life. Family, including daughter, wife, and brother at bedside.     MEDICATIONS  (STANDING):  allopurinol 300 milliGRAM(s) Oral daily  amLODIPine   Tablet 5 milliGRAM(s) Oral daily  atorvastatin 80 milliGRAM(s) Oral at bedtime  lactated ringers. 1000 milliLiter(s) (75 mL/Hr) IV Continuous <Continuous>  levETIRAcetam 500 milliGRAM(s) Oral two times a day  tamsulosin 0.4 milliGRAM(s) Oral at bedtime    MEDICATIONS  (PRN):  acetaminophen   Tablet .. 650 milliGRAM(s) Oral every 6 hours PRN Mild Pain (1 - 3)      Vital Signs Last 24 Hrs  T(C): 36.6 (02-07-19 @ 08:50)  T(F): 97.9 (02-07-19 @ 08:50), Max: 98 (02-06-19 @ 15:08)  HR: 70 (02-07-19 @ 08:50) (63 - 83)  BP: 124/75 (02-07-19 @ 08:50)  BP(mean): --  RR: 18 (02-07-19 @ 08:50) (18 - 18)  SpO2: 100% (02-07-19 @ 08:50) (98% - 100%)  Wt(kg): --    02-06 @ 07:01  -  02-07 @ 07:00  --------------------------------------------------------  IN: 225 mL / OUT: 650 mL / NET: -425 mL    02-07 @ 07:01  -  02-07 @ 12:27  --------------------------------------------------------  IN: 150 mL / OUT: 0 mL / NET: 150 mL      CAPILLARY BLOOD GLUCOSE        I&O's Summary    06 Feb 2019 07:01  -  07 Feb 2019 07:00  --------------------------------------------------------  IN: 225 mL / OUT: 650 mL / NET: -425 mL    07 Feb 2019 07:01  -  07 Feb 2019 12:27  --------------------------------------------------------  IN: 150 mL / OUT: 0 mL / NET: 150 mL        PHYSICAL EXAM:  GENERAL: NAD, well-developed  HEAD:  Atraumatic, Normocephalic  EYES: EOMI, PERRLA, conjunctiva and sclera clear  NECK: Supple, No JVD  CHEST/LUNG: Clear to auscultation bilaterally; No wheeze  HEART: Regular rate and rhythm; No murmurs, rubs, or gallops  ABDOMEN: Soft, Nontender, Nondistended; Bowel sounds present  EXTREMITIES:  2+ Peripheral Pulses, No clubbing, cyanosis, or edema  PSYCH: AAOx3  NEUROLOGY: non-focal  SKIN: No rashes or lesions    LABS:                        15.3   7.45  )-----------( 223      ( 06 Feb 2019 05:30 )             44.7     02-06    139  |  103  |  17  ----------------------------<  132<H>  3.9   |  22  |  1.31<H>    Ca    9.5      06 Feb 2019 05:30      PT/INR - ( 06 Feb 2019 05:30 )   PT: 10.9 SEC;   INR: 0.98          PTT - ( 06 Feb 2019 05:30 )  PTT:31.7 SEC          RADIOLOGY & ADDITIONAL TESTS:    Imaging Personally Reviewed:    Consultant(s) Notes Reviewed:      Care Discussed with Consultants/Other Providers:    Assessment and Plan: [facility] Medicine  Pager: [phone]    Patient is a 83y old  Male who presents with a chief complaint of Transfer from [facility] for subacute on chronic subdural hematoma s/p fall 1 week ago (07 Feb 2019 02:24)      SUBJECTIVE / OVERNIGHT EVENTS: No acute events overnight. Denies any CP, SOB, palpitations, light-headedness. Reports he just wants to get the procedure over with so that he can get back to his normally active life. Family, including daughter, wife, and brother at bedside.     MEDICATIONS  (STANDING):  allopurinol 300 milliGRAM(s) Oral daily  amLODIPine   Tablet 5 milliGRAM(s) Oral daily  atorvastatin 80 milliGRAM(s) Oral at bedtime  lactated ringers. 1000 milliLiter(s) (75 mL/Hr) IV Continuous <Continuous>  levETIRAcetam 500 milliGRAM(s) Oral two times a day  tamsulosin 0.4 milliGRAM(s) Oral at bedtime    MEDICATIONS  (PRN):  acetaminophen   Tablet .. 650 milliGRAM(s) Oral every 6 hours PRN Mild Pain (1 - 3)      Vital Signs Last 24 Hrs  T(C): 36.6 (02-07-19 @ 08:50)  T(F): 97.9 (02-07-19 @ 08:50), Max: 98 (02-06-19 @ 15:08)  HR: 70 (02-07-19 @ 08:50) (63 - 83)  BP: 124/75 (02-07-19 @ 08:50)  BP(mean): --  RR: 18 (02-07-19 @ 08:50) (18 - 18)  SpO2: 100% (02-07-19 @ 08:50) (98% - 100%)  Wt(kg): --    02-06 @ 07:01  -  02-07 @ 07:00  --------------------------------------------------------  IN: 225 mL / OUT: 650 mL / NET: -425 mL    02-07 @ 07:01  -  02-07 @ 12:27  --------------------------------------------------------  IN: 150 mL / OUT: 0 mL / NET: 150 mL      CAPILLARY BLOOD GLUCOSE        I&O's Summary    06 Feb 2019 07:01  -  07 Feb 2019 07:00  --------------------------------------------------------  IN: 225 mL / OUT: 650 mL / NET: -425 mL    07 Feb 2019 07:01  -  07 Feb 2019 12:27  --------------------------------------------------------  IN: 150 mL / OUT: 0 mL / NET: 150 mL    PHYSICAL EXAM  GENERAL: NAD, well-groomed, well-developed  HEAD:  Atraumatic, Normocephalic  EYES:  Conjunctiva and sclera clear  ENMT: Moist mucous membranes  NECK: Supple, No JVD  RESPIRATORY: Clear to auscultation bilaterally; No wheezing  CARDIOVASCULAR: Regular rate and rhythm; No murmurs,   GASTROINTESTINAL: Soft, Nontender, Nondistended; Bowel sounds present  EXTREMITIES:  2+ Peripheral Pulses, No clubbing, cyanosis, or edema  NERVOUS SYSTEM:  Alert & Oriented X3; Moving all 4 extremities; No gross sensory deficits  SKIN: Nasal lesion    LABS:                        15.3   7.45  )-----------( 223      ( 06 Feb 2019 05:30 )             44.7     02-06    139  |  103  |  17  ----------------------------<  132<H>  3.9   |  22  |  1.31<H>    Ca    9.5      06 Feb 2019 05:30      PT/INR - ( 06 Feb 2019 05:30 )   PT: 10.9 SEC;   INR: 0.98          PTT - ( 06 Feb 2019 05:30 )  PTT:31.7 SEC          RADIOLOGY & ADDITIONAL TESTS:    Imaging Personally Reviewed:  < from: Xray Chest 1 View- PORTABLE-Routine (02.06.19 @ 10:58) >  IMPRESSION:   Clear lungs.    < end of copied text >      Consultant(s) Notes Reviewed:  Neurosurgery    Care Discussed with Consultants/Other Providers: Neurosurgery re: plan of care    Assessment and Plan:

## 2019-02-07 NOTE — PROGRESS NOTE ADULT - ASSESSMENT
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

## 2019-02-07 NOTE — CONSULT NOTE ADULT - SUBJECTIVE AND OBJECTIVE BOX
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     Around 1 month ago, patient was having difficulty walking with gait wide based. Patient has had 2 falls in the last month, most recent was one week ago while walking to his car. Patient does not remember if he hit his head. During this fall, he injured his right arm and right side of the neck. [facility] CT head demonstrated Right frontoparietal subdural collection with subacute to chronic hemorrhagic products measuring 2.2cm with 3.6cm of midline shift. Patient was transferred to [facility] for intervention.  Patient underwent right diallo hole for evac of chronic SDH.  A PERICO was left in place, patient extubated post-op.  SICU consulted for q1h neurochecks.        PAST MEDICAL HISTORY: Subdural hematoma  Lymphoma in remission  BPH (benign prostatic hyperplasia)  Gout  Hyperlipidemia  Essential hypertension      PAST SURGICAL HISTORY: History of hip replacement    CODE STATUS: Full code     HOME MEDICATIONS:  allopurinol 300 mg oral tablet: 1 tab(s) orally once a day (05 Feb 2019 20:35)  amLODIPine 5 mg oral tablet: 1 tab(s) orally once a day (05 Feb 2019 20:36)  rosuvastatin 40 mg oral tablet: 1 tab(s) orally once a day (at bedtime) (05 Feb 2019 20:36)  tamsulosin 0.4 mg oral capsule: 1 cap(s) orally once a day (05 Feb 2019 20:36)      ALLERGIES: aspirin (Rash; Urticaria; Hives)  Motrin (Rash)  NSAIDs (Anaphylaxis; Urticaria; Hives)      VITAL SIGNS:  ICU Vital Signs Last 24 Hrs  T(C): 36.4 (07 Feb 2019 13:45), Max: 36.7 (07 Feb 2019 04:54)  T(F): 97.6 (07 Feb 2019 13:45), Max: 98 (07 Feb 2019 04:54)  HR: 75 (07 Feb 2019 13:45) (70 - 83)  BP: 135/78 (07 Feb 2019 13:45) (124/75 - 159/80)  BP(mean): --  ABP: --  ABP(mean): --  RR: 14 (07 Feb 2019 13:45) (14 - 18)  SpO2: 95% (07 Feb 2019 13:45) (95% - 100%)      NEURO  Exam: AOx3, moves all extremities with equal strength, EOMI,   acetaminophen   Tablet .. 650 milliGRAM(s) Oral every 6 hours PRN Mild Pain (1 - 3)  levETIRAcetam 500 milliGRAM(s) Oral two times a day  oxyCODONE    5 mG/acetaminophen 325 mG 1 Tablet(s) Oral every 4 hours PRN Moderate Pain (4 - 6)      RESPIRATORY  Exam: nonlabored breathing      CARDIOVASCULAR  Exam: RRR, normotensive  Cardiac Rhythm: sinus  amLODIPine   Tablet 5 milliGRAM(s) Oral daily  tamsulosin 0.4 milliGRAM(s) Oral at bedtime      GI/NUTRITION  Exam: soft, nt, nd  Diet: regular       GENITOURINARY/RENAL  sodium chloride 0.9%. 1000 milliLiter(s) IV Continuous <Continuous>      02-06 @ 07:01  -  02-07 @ 07:00  --------------------------------------------------------  IN:    lactated ringers.: 225 mL  Total IN: 225 mL    OUT:    Voided: 650 mL  Total OUT: 650 mL    Total NET: -425 mL      02-07 @ 07:01  -  02-07 @ 17:29  --------------------------------------------------------  IN:    lactated ringers.: 150 mL  Total IN: 150 mL    OUT:  Total OUT: 0 mL    Total NET: 150 mL        Weight (kg): 75.3 (02-07 @ 04:30)  02-06    139  |  103  |  17  ----------------------------<  132<H>  3.9   |  22  |  1.31<H>    Ca    9.5      06 Feb 2019 05:30      [x] Gaffney catheter, indication: urine output monitoring in critically ill patient    HEMATOLOGIC  [ ] VTE Prophylaxis: holding in setting of SDH                          15.3   7.45  )-----------( 223      ( 06 Feb 2019 05:30 )             44.7     PT/INR - ( 06 Feb 2019 05:30 )   PT: 10.9 SEC;   INR: 0.98          PTT - ( 06 Feb 2019 05:30 )  PTT:31.7 SEC  Transfusion: [ ] PRBC	[ ] Platelets	[ ] FFP	[ ] Cryoprecipitate      INFECTIOUS DISEASES  no active issues       ENDOCRINE  allopurinol 300 milliGRAM(s) Oral daily  atorvastatin 80 milliGRAM(s) Oral at bedtime    CAPILLARY BLOOD GLUCOSE      POCT Blood Glucose.: 109 mg/dL (07 Feb 2019 11:57)      PATIENT CARE ACCESS DEVICES:  [x] Peripheral IV  [ ] Central Venous Line	[ ] R	[ ] L	[ ] IJ	[ ] Fem	[ ] SC	Placed:   [x] Arterial Line		[ ] R	[ ] L	[ ] Fem	[ ] Rad	[ ] Ax	Placed:   [ ] PICC:					[ ] Mediport  [ ] Urinary Catheter, Date Placed:   [x] Necessity of urinary, arterial, and venous catheters discussed      IMAGING STUDIES:  < from: CT Head No Cont (02.06.19 @ 11:18) >    IMPRESSION:    The right frontal parietal subdural hematoma and associated right to left   midline shift are stable compared to the outside head CT study from   02/04/2019.    < end of copied text >

## 2019-02-07 NOTE — PROGRESS NOTE ADULT - SUBJECTIVE AND OBJECTIVE BOX
POST OPERATIVE CHECK    s/p RIGHT BURRHOLE FOR SUBACUTE ON CHRONIC SUBDURAL HEMATOMA. DOiING WELL DENIES HEADACHES, NO VOMITING      Vital Signs Last 24 Hrs  T(C): 37 (07 Feb 2019 20:00), Max: 37 (07 Feb 2019 20:00)  T(F): 98.6 (07 Feb 2019 20:00), Max: 98.6 (07 Feb 2019 20:00)  HR: 94 (07 Feb 2019 20:00) (70 - 94)  BP: 137/87 (07 Feb 2019 20:00) (124/75 - 159/81)  BP(mean): 100 (07 Feb 2019 20:00) (90 - 103)  RR: 18 (07 Feb 2019 20:00) (10 - 18)  SpO2: 95% (07 Feb 2019 20:00) (93% - 100%)    DRAIN OUTPUT: 45cc      PHYSICAL EXAM:  Mental Staus: Awake, Alert, Attentive Oriented x 4  No LUE drift  R should pain improved deltoid 4+/5 distally 5/5  Sensory intact  PERICO drain with bloody drainage      DIET:  [ ] NPO  [ ] Regular    LABS:                        15.3   7.45  )-----------( 223      ( 06 Feb 2019 05:30 )             44.7     02-06    139  |  103  |  17  ----------------------------<  132<H>  3.9   |  22  |  1.31<H>    Ca    9.5      06 Feb 2019 05:30      PT/INR - ( 06 Feb 2019 05:30 )   PT: 10.9 SEC;   INR: 0.98          PTT - ( 06 Feb 2019 05:30 )  PTT:31.7 SEC    CAPILLARY BLOOD GLUCOSE      POCT Blood Glucose.: 109 mg/dL (07 Feb 2019 11:57)      CULTURES:      CSF Analysis:         Drug Levels:       Allergies    aspirin (Rash; Urticaria; Hives)  Motrin (Rash)  NSAIDs (Anaphylaxis; Urticaria; Hives)    Intolerances        MEDICATIONS:  Antibiotics:    Neuro:  acetaminophen   Tablet .. 650 milliGRAM(s) Oral every 6 hours PRN  fentaNYL    Injectable 25 MICROGram(s) IV Push every 5 minutes PRN  HYDROmorphone  Injectable 0.5 milliGRAM(s) IV Push every 10 minutes PRN  levETIRAcetam 500 milliGRAM(s) Oral two times a day  ondansetron Injectable 4 milliGRAM(s) IV Push once PRN  oxyCODONE    5 mG/acetaminophen 325 mG 1 Tablet(s) Oral every 4 hours PRN    Anticoagulation    OTHER:  allopurinol 300 milliGRAM(s) Oral daily  amLODIPine   Tablet 5 milliGRAM(s) Oral daily  atorvastatin 80 milliGRAM(s) Oral at bedtime  tamsulosin 0.4 milliGRAM(s) Oral at bedtime    IVF:  sodium chloride 0.9%. 1000 milliLiter(s) IV Continuous <Continuous>        DVT PROPHYLAXIS:  [] Venodynes                                [] Heparin/Lovenox    RADIOLOGY & ADDITIONAL TESTS:

## 2019-02-07 NOTE — PROGRESS NOTE ADULT - SUBJECTIVE AND OBJECTIVE BOX
No issues overnight  Vital Signs Last 24 Hrs  T(C): 36.4 (06 Feb 2019 21:17), Max: 36.7 (06 Feb 2019 15:08)  T(F): 97.5 (06 Feb 2019 21:17), Max: 98 (06 Feb 2019 15:08)  HR: 83 (06 Feb 2019 21:17) (62 - 83)  BP: 138/79 (06 Feb 2019 21:17) (106/56 - 138/79)  BP(mean): --  RR: 18 (06 Feb 2019 21:17) (17 - 18)  SpO2: 99% (06 Feb 2019 21:17) (96% - 100%)    AAO X 3  PERRLA, EOMI  CN 2-12 grossly intact  WARREN strength 5/5  No drift  SILT    MEDICATIONS  (STANDING):  allopurinol 300 milliGRAM(s) Oral daily  amLODIPine   Tablet 5 milliGRAM(s) Oral daily  atorvastatin 80 milliGRAM(s) Oral at bedtime  levETIRAcetam 500 milliGRAM(s) Oral two times a day  tamsulosin 0.4 milliGRAM(s) Oral at bedtime    MEDICATIONS  (PRN):  acetaminophen   Tablet .. 650 milliGRAM(s) Oral every 6 hours PRN Mild Pain (1 - 3)                          15.3   7.45  )-----------( 223      ( 06 Feb 2019 05:30 )             44.7   02-06    139  |  103  |  17  ----------------------------<  132<H>  3.9   |  22  |  1.31<H>    Ca    9.5      06 Feb 2019 05:30    PT/INR - ( 06 Feb 2019 05:30 )   PT: 10.9 SEC;   INR: 0.98          PTT - ( 06 Feb 2019 05:30 )  PTT:31.7 SEC    Type + Screen (02.06.19 @ 06:00)    ABO Interpretation: O    Rh Interpretation: Negative    Antibody Screen: Negative    < from: CT Head No Cont (02.06.19 @ 11:18) >  Description: A noncontrast head CT was performed with a stereotactic head   CT protocol.    Comparison is made to an outside head CT study from 02/04/2019.    A right frontal parietal subdural hematoma is again noted, grossly stable   in size compared to the outside head CT study. The subdural demonstrate   isodense and low density components. The subdural measures up to 2 cm   greatest transverse thickness.    The 4 mm of right to left midline shift, and the ventricular size are   stable. There is no evidence for hydrocephalus. Chronic white matter   changes and cerebral volume loss are again noted.    Moderatemucosal thickening and bony wall thickening involves the left   maxillary sinus, consistent with chronic sinusitis. The left frontal   sinus is extensively opacified, most consistent with sinusitis.    IMPRESSION:    The right frontal parietal subdural hematoma and associated right to left   midline shift are stable compared to the outside head CT study from   02/04/2019.    < end of copied text >    < from: Xray Chest 1 View- PORTABLE-Routine (02.06.19 @ 10:58) >  FINDINGS:     The cardiac silhouette is normal in size. There is no pleural effusion.   There is no pneumothorax. No focal consolidation identified. There are   degenerative changes of the visualized thoracic spine and the shoulders.   Aortic calcifications.    IMPRESSION:   Clear lungs.    < end of copied text >

## 2019-02-07 NOTE — PRE-OP CHECKLIST - BMI (KG/M2)
Noted.  Also of note; patient is currently admitted to [facility] for dyspnea 31.8

## 2019-02-07 NOTE — PROGRESS NOTE ADULT - PROBLEM SELECTOR PLAN 2
Plan for OR today for Bernice Hole with possible craniotomy and evacuation of SDH  -Management as per neurosurgery

## 2019-02-08 LAB
ANION GAP SERPL CALC-SCNC: 18 MMO/L — HIGH (ref 7–14)
BASOPHILS # BLD AUTO: 0.01 K/UL — SIGNIFICANT CHANGE UP (ref 0–0.2)
BASOPHILS NFR BLD AUTO: 0.1 % — SIGNIFICANT CHANGE UP (ref 0–2)
BUN SERPL-MCNC: 16 MG/DL — SIGNIFICANT CHANGE UP (ref 7–23)
CALCIUM SERPL-MCNC: 9.3 MG/DL — SIGNIFICANT CHANGE UP (ref 8.4–10.5)
CHLORIDE SERPL-SCNC: 100 MMOL/L — SIGNIFICANT CHANGE UP (ref 98–107)
CO2 SERPL-SCNC: 20 MMOL/L — LOW (ref 22–31)
CREAT SERPL-MCNC: 1.12 MG/DL — SIGNIFICANT CHANGE UP (ref 0.5–1.3)
EOSINOPHIL # BLD AUTO: 0 K/UL — SIGNIFICANT CHANGE UP (ref 0–0.5)
EOSINOPHIL NFR BLD AUTO: 0 % — SIGNIFICANT CHANGE UP (ref 0–6)
GLUCOSE SERPL-MCNC: 149 MG/DL — HIGH (ref 70–99)
HCT VFR BLD CALC: 44.5 % — SIGNIFICANT CHANGE UP (ref 39–50)
HGB BLD-MCNC: 15.6 G/DL — SIGNIFICANT CHANGE UP (ref 13–17)
IMM GRANULOCYTES NFR BLD AUTO: 0.8 % — SIGNIFICANT CHANGE UP (ref 0–1.5)
LYMPHOCYTES # BLD AUTO: 0.66 K/UL — LOW (ref 1–3.3)
LYMPHOCYTES # BLD AUTO: 8.7 % — LOW (ref 13–44)
MAGNESIUM SERPL-MCNC: 1.9 MG/DL — SIGNIFICANT CHANGE UP (ref 1.6–2.6)
MCHC RBC-ENTMCNC: 31.6 PG — SIGNIFICANT CHANGE UP (ref 27–34)
MCHC RBC-ENTMCNC: 35.1 % — SIGNIFICANT CHANGE UP (ref 32–36)
MCV RBC AUTO: 90.3 FL — SIGNIFICANT CHANGE UP (ref 80–100)
MONOCYTES # BLD AUTO: 0.23 K/UL — SIGNIFICANT CHANGE UP (ref 0–0.9)
MONOCYTES NFR BLD AUTO: 3 % — SIGNIFICANT CHANGE UP (ref 2–14)
NEUTROPHILS # BLD AUTO: 6.6 K/UL — SIGNIFICANT CHANGE UP (ref 1.8–7.4)
NEUTROPHILS NFR BLD AUTO: 87.4 % — HIGH (ref 43–77)
NRBC # FLD: 0 K/UL — LOW (ref 25–125)
PHOSPHATE SERPL-MCNC: 4 MG/DL — SIGNIFICANT CHANGE UP (ref 2.5–4.5)
PLATELET # BLD AUTO: 212 K/UL — SIGNIFICANT CHANGE UP (ref 150–400)
PMV BLD: 10.4 FL — SIGNIFICANT CHANGE UP (ref 7–13)
POTASSIUM SERPL-MCNC: 4.3 MMOL/L — SIGNIFICANT CHANGE UP (ref 3.5–5.3)
POTASSIUM SERPL-SCNC: 4.3 MMOL/L — SIGNIFICANT CHANGE UP (ref 3.5–5.3)
RBC # BLD: 4.93 M/UL — SIGNIFICANT CHANGE UP (ref 4.2–5.8)
RBC # FLD: 12.7 % — SIGNIFICANT CHANGE UP (ref 10.3–14.5)
SODIUM SERPL-SCNC: 138 MMOL/L — SIGNIFICANT CHANGE UP (ref 135–145)
WBC # BLD: 7.56 K/UL — SIGNIFICANT CHANGE UP (ref 3.8–10.5)
WBC # FLD AUTO: 7.56 K/UL — SIGNIFICANT CHANGE UP (ref 3.8–10.5)

## 2019-02-08 PROCEDURE — 99221 1ST HOSP IP/OBS SF/LOW 40: CPT

## 2019-02-08 PROCEDURE — 99233 SBSQ HOSP IP/OBS HIGH 50: CPT | Mod: GC

## 2019-02-08 RX ORDER — CHLORHEXIDINE GLUCONATE 213 G/1000ML
1 SOLUTION TOPICAL
Qty: 0 | Refills: 0 | Status: DISCONTINUED | OUTPATIENT
Start: 2019-02-08 | End: 2019-02-09

## 2019-02-08 RX ORDER — SODIUM CHLORIDE 9 MG/ML
1000 INJECTION, SOLUTION INTRAVENOUS
Qty: 0 | Refills: 0 | Status: DISCONTINUED | OUTPATIENT
Start: 2019-02-08 | End: 2019-02-09

## 2019-02-08 RX ADMIN — CHLORHEXIDINE GLUCONATE 1 APPLICATION(S): 213 SOLUTION TOPICAL at 12:26

## 2019-02-08 RX ADMIN — Medication 300 MILLIGRAM(S): at 15:27

## 2019-02-08 RX ADMIN — Medication 100 MILLIGRAM(S): at 16:19

## 2019-02-08 RX ADMIN — AMLODIPINE BESYLATE 5 MILLIGRAM(S): 2.5 TABLET ORAL at 05:00

## 2019-02-08 RX ADMIN — Medication 100 MILLIGRAM(S): at 00:07

## 2019-02-08 RX ADMIN — LEVETIRACETAM 500 MILLIGRAM(S): 250 TABLET, FILM COATED ORAL at 19:19

## 2019-02-08 RX ADMIN — LEVETIRACETAM 500 MILLIGRAM(S): 250 TABLET, FILM COATED ORAL at 05:00

## 2019-02-08 RX ADMIN — Medication 650 MILLIGRAM(S): at 04:04

## 2019-02-08 RX ADMIN — Medication 100 MILLIGRAM(S): at 08:25

## 2019-02-08 RX ADMIN — Medication 650 MILLIGRAM(S): at 04:19

## 2019-02-08 RX ADMIN — SODIUM CHLORIDE 60 MILLILITER(S): 9 INJECTION INTRAMUSCULAR; INTRAVENOUS; SUBCUTANEOUS at 05:01

## 2019-02-08 RX ADMIN — TAMSULOSIN HYDROCHLORIDE 0.4 MILLIGRAM(S): 0.4 CAPSULE ORAL at 22:35

## 2019-02-08 NOTE — PROGRESS NOTE ADULT - SUBJECTIVE AND OBJECTIVE BOX
OVERNIGHT EVENTS: Doing well. Improved overall.     Vital Signs Last 24 Hrs  T(C): 36.5 (08 Feb 2019 00:00), Max: 37 (07 Feb 2019 20:00)  T(F): 97.7 (08 Feb 2019 00:00), Max: 98.6 (07 Feb 2019 20:00)  HR: 95 (08 Feb 2019 02:00) (70 - 107)  BP: 129/85 (08 Feb 2019 02:00) (116/72 - 159/81)  BP(mean): 95 (08 Feb 2019 02:00) (83 - 103)  RR: 14 (08 Feb 2019 02:00) (10 - 19)  SpO2: 99% (08 Feb 2019 02:00) (93% - 100%)    DRAIN OUTPUT: PERICO 55cc  ICP:     PHYSICAL EXAM:  Mental Staus: Awake, Alert, Attentive Oriented x 4  Cranial Nerves: Normal bilaterally  Motor:  Intact  Tone: Normal  Strength: 5/5 all 4 extremities, RUE deloid 4+ given shoullder pain but improved  Reflexes: Normal in all extremities  Incision/Wound: c/d/i      [ ] Regular    LABS:                        15.3   7.45  )-----------( 223      ( 06 Feb 2019 05:30 )             44.7     02-06    139  |  103  |  17  ----------------------------<  132<H>  3.9   |  22  |  1.31<H>    Ca    9.5      06 Feb 2019 05:30      PT/INR - ( 06 Feb 2019 05:30 )   PT: 10.9 SEC;   INR: 0.98          PTT - ( 06 Feb 2019 05:30 )  PTT:31.7 SEC    CAPILLARY BLOOD GLUCOSE      POCT Blood Glucose.: 109 mg/dL (07 Feb 2019 11:57)      CULTURES:      CSF Analysis:         Drug Levels:       Allergies    aspirin (Rash; Urticaria; Hives)  Motrin (Rash)  NSAIDs (Anaphylaxis; Urticaria; Hives)    Intolerances        MEDICATIONS:  Antibiotics:  ceFAZolin   IVPB 2000 milliGRAM(s) IV Intermittent every 8 hours    Neuro:  acetaminophen   Tablet .. 650 milliGRAM(s) Oral every 6 hours PRN  fentaNYL    Injectable 25 MICROGram(s) IV Push every 5 minutes PRN  HYDROmorphone  Injectable 0.5 milliGRAM(s) IV Push every 10 minutes PRN  levETIRAcetam 500 milliGRAM(s) Oral two times a day  ondansetron Injectable 4 milliGRAM(s) IV Push once PRN  oxyCODONE    5 mG/acetaminophen 325 mG 1 Tablet(s) Oral every 4 hours PRN    Anticoagulation    OTHER:  allopurinol 300 milliGRAM(s) Oral daily  amLODIPine   Tablet 5 milliGRAM(s) Oral daily  atorvastatin 80 milliGRAM(s) Oral at bedtime  tamsulosin 0.4 milliGRAM(s) Oral at bedtime    IVF:  sodium chloride 0.9%. 1000 milliLiter(s) IV Continuous <Continuous>        DVT PROPHYLAXIS:  [] Venodynes                                [] Heparin/Lovenox    RADIOLOGY & ADDITIONAL TESTS:  CT head post op- Improved mass effect and chronic subdural significally decreased

## 2019-02-08 NOTE — PROGRESS NOTE ADULT - ASSESSMENT
83 year old M s/p right burrhole for evacuation of chronic subdural  POD # 1 with subdural PERICO drain

## 2019-02-08 NOTE — PROGRESS NOTE ADULT - SUBJECTIVE AND OBJECTIVE BOX
POST ANESTHESIA EVALUATION    83y Male POSTOP DAY 1 S/P diallo hole    MENTAL STATUS: Patient participation [ x ] Awake     [  ] Arousable     [  ] Sedated    AIRWAY PATENCY: [x  ] Satisfactory  [  ] Other:     Vital Signs Last 24 Hrs  T(C): 36.1 (08 Feb 2019 08:00), Max: 37 (07 Feb 2019 20:00)  T(F): 97 (08 Feb 2019 08:00), Max: 98.6 (07 Feb 2019 20:00)  HR: 103 (08 Feb 2019 09:00) (75 - 107)  BP: 148/80 (08 Feb 2019 09:00) (114/86 - 159/81)  BP(mean): 96 (08 Feb 2019 09:00) (83 - 103)  RR: 24 (08 Feb 2019 09:00) (10 - 24)  SpO2: 95% (08 Feb 2019 09:00) (93% - 100%)  I&O's Summary    07 Feb 2019 07:01  -  08 Feb 2019 07:00  --------------------------------------------------------  IN: 1520 mL / OUT: 1280 mL / NET: 240 mL          NAUSEA/ VOMITTING:  [ x ] NONE  [  ] CONTROLLED [  ] OTHER     PAIN: [ x ] CONTROLLED WITH CURRENT REGIMEN  [  ] OTHER    [x  ] NO APPARENT ANESTHESIA COMPLICATIONS      Comments:

## 2019-02-08 NOTE — PROGRESS NOTE ADULT - ATTENDING COMMENTS
Current Issues:  S06.5X9A Subdural hematoma   - stable exam.  Subdural PERICO in place  I10 Hypertension, unspecified type   - controlled  E78.5 Hyperlipidemia, unspecified hyperlipidemia type   - on statin  M10.9 Gout, unspecified cause, unspecified chronicity, unspecified site   - on allopurinol  Z91.89 At risk for malnutrition  - on diet

## 2019-02-08 NOTE — PHYSICAL THERAPY INITIAL EVALUATION ADULT - GENERAL OBSERVATIONS, REHAB EVAL
Patient received supine in bed; No apparent distress. (+) lisa Patient received supine in bed; No apparent distress. (+) heplock, PERICO drain, telemetry monitor , BP cuff

## 2019-02-08 NOTE — CONSULT NOTE ADULT - REASON FOR ADMISSION
Transfer from [facility] for subacute on chronic subdural hematoma s/p fall 1 week ago
Transfer from [facility] for subacute on chronic subdural hematoma s/p fall 1 week ago
Transfer from [facility] for subacute on chronic subdural hematoma s/p fall 1 week ago

## 2019-02-08 NOTE — PROGRESS NOTE ADULT - SUBJECTIVE AND OBJECTIVE BOX
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     Around 1 month ago, patient was having difficulty walking with gait wide based. Patient has had 2 falls in the last month, most recent was one week ago while walking to his car. Patient does not remember if he hit his head. During this fall, he injured his right arm and right side of the neck. [facility] demonstrated Right frontoparietal subdural collection with subacute to chronic hemorrhagic products measuring 2.2cm with 3.6cm of midline shift. Patient was transferred to [facility] for intervention.  Patient underwent right diallo hole for evac of chronic SDH.  A PERICO was left in place, patient extubated post-op.  SICU consulted for q1h neurochecks.        24 HOUR EVENTS: no post-op events.      SUBJECTIVE/ROS:  [x] A ten-point review of systems was otherwise negative except as noted.  [ ] Due to altered mental status/intubation, subjective information were not able to be obtained from the patient. History was obtained, to the extent possible, from review of the chart and collateral sources of information.      NEURO  RASS:     GCS:     CAM ICU:  Exam: awake, alert, oriented, follows commands, moves all extremities with equal strength, subdural PERICO with serosanguinous output  Meds: acetaminophen   Tablet .. 650 milliGRAM(s) Oral every 6 hours PRN Mild Pain (1 - 3)  fentaNYL    Injectable 25 MICROGram(s) IV Push every 5 minutes PRN Moderate Pain (4 - 6)  HYDROmorphone  Injectable 0.5 milliGRAM(s) IV Push every 10 minutes PRN Severe Pain (7 - 10)  levETIRAcetam 500 milliGRAM(s) Oral two times a day  ondansetron Injectable 4 milliGRAM(s) IV Push once PRN Nausea and/or Vomiting  oxyCODONE    5 mG/acetaminophen 325 mG 1 Tablet(s) Oral every 4 hours PRN Moderate Pain (4 - 6)    [x] Adequacy of sedation and pain control has been assessed and adjusted      RESPIRATORY  RR: 15 (02-07-19 @ 23:00) (10 - 19)  SpO2: 97% (02-07-19 @ 23:00) (93% - 100%)  Wt(kg): --  Exam: unlabored, clear to auscultation bilaterally      CARDIOVASCULAR  HR: 102 (02-07-19 @ 23:00) (70 - 105)  BP: 145/88 (02-07-19 @ 23:00) (124/75 - 159/81)  BP(mean): 97 (02-07-19 @ 23:00) (84 - 103)    Exam: regular rate and rhythm  Cardiac Rhythm: sinus  Perfusion     [x]Adequate   [ ]Inadequate  Mentation   [x]Normal       [ ]Reduced  Extremities  [x]Warm         [ ]Cool  Volume Status [ ]Hypervolemic [x]Euvolemic [ ]Hypovolemic  Meds: amLODIPine   Tablet 5 milliGRAM(s) Oral daily  tamsulosin 0.4 milliGRAM(s) Oral at bedtime        GI/NUTRITION  Exam: soft, nontender, nondistended  Diet: reg      GENITOURINARY  I&O's Detail    02-06 @ 07:01  -  02-07 @ 07:00  --------------------------------------------------------  IN:    lactated ringers.: 225 mL  Total IN: 225 mL    OUT:    Voided: 650 mL  Total OUT: 650 mL    Total NET: -425 mL      02-07 @ 07:01  -  02-08 @ 00:46  --------------------------------------------------------  IN:    IV PiggyBack: 50 mL    lactated ringers.: 150 mL    Oral Fluid: 560 mL    sodium chloride 0.9%.: 360 mL  Total IN: 1120 mL    OUT:    Bulb: 55 mL    Voided: 925 mL  Total OUT: 980 mL    Total NET: 140 mL        Weight (kg): 75.3 (02-07 @ 04:30)  02-06    139  |  103  |  17  ----------------------------<  132<H>  3.9   |  22  |  1.31<H>    Ca    9.5      06 Feb 2019 05:30    Meds: sodium chloride 0.9%. 1000 milliLiter(s) IV Continuous <Continuous>        HEMATOLOGIC  Meds:   [x] VTE Prophylaxis holding in setting of SDH                        15.3   7.45  )-----------( 223      ( 06 Feb 2019 05:30 )             44.7     PT/INR - ( 06 Feb 2019 05:30 )   PT: 10.9 SEC;   INR: 0.98          PTT - ( 06 Feb 2019 05:30 )  PTT:31.7 SEC  Transfusion     [ ] PRBC   [ ] Platelets   [ ] FFP   [ ] Cryoprecipitate      INFECTIOUS DISEASES  Meds: ceFAZolin   IVPB 2000 milliGRAM(s) IV Intermittent every 8 hours        ENDOCRINE  POCT Blood Glucose.: 109 mg/dL (07 Feb 2019 11:57)  Meds: allopurinol 300 milliGRAM(s) Oral daily  atorvastatin 80 milliGRAM(s) Oral at bedtime        ACCESS DEVICES:  [x] Peripheral IV  [ ] Central Venous Line	[ ] R	[ ] L	[ ] IJ	[ ] Fem	[ ] SC	Placed:   [x] Arterial Line		[ ] R	[ ] L	[ ] Fem	[ ] Rad	[ ] Ax	Placed:   [ ] PICC:					[ ] Mediport  [ ] Urinary Catheter, Date Placed:   [x] Necessity of urinary, arterial, and venous catheters discussed    CODE STATUS: full code

## 2019-02-08 NOTE — CONSULT NOTE ADULT - SUBJECTIVE AND OBJECTIVE BOX
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  Dermatology consulted for lesion on nose which has been growing, present for 4 weeks, asymptomatic. Denies prior history of skin cancer but has had actinic keratoses treated with Efudex in the past.  Does not have dermatologist that he follows with regularly.         PAST MEDICAL & SURGICAL HISTORY:  Subdural hematoma  Lymphoma in remission: Tonsillar large cell lymphoma in remission since 2015. Treatment @ [facility]  BPH (benign prostatic hyperplasia)  Gout  Hyperlipidemia  Essential hypertension  History of hip replacement      REVIEW OF SYSTEMS      General: no fevers/chills, no lethargy	    Skin/Breast: see HPI  	  Ophthalmologic: no eye pain or change in vision  	  ENMT: no dysphagia or change in hearing    Respiratory and Thorax: no SOB or cough  	  Cardiovascular: no palpitations or chest pain    Gastrointestinal: no abdominal pain or blood in stool     Genitourinary: no dysuria or frequency    Musculoskeletal: no joint pains or weakness	    Neurological: no weakness, numbness , or tingling    MEDICATIONS  (STANDING):  allopurinol 300 milliGRAM(s) Oral daily  amLODIPine   Tablet 5 milliGRAM(s) Oral daily  atorvastatin 80 milliGRAM(s) Oral at bedtime  ceFAZolin   IVPB 2000 milliGRAM(s) IV Intermittent every 8 hours  chlorhexidine 4% Liquid 1 Application(s) Topical <User Schedule>  lactated ringers. 1000 milliLiter(s) (60 mL/Hr) IV Continuous <Continuous>  levETIRAcetam 500 milliGRAM(s) Oral two times a day  tamsulosin 0.4 milliGRAM(s) Oral at bedtime    MEDICATIONS  (PRN):  acetaminophen   Tablet .. 650 milliGRAM(s) Oral every 6 hours PRN Mild Pain (1 - 3)  oxyCODONE    5 mG/acetaminophen 325 mG 1 Tablet(s) Oral every 4 hours PRN Moderate Pain (4 - 6)      Allergies    aspirin (Rash; Urticaria; Hives)  Motrin (Rash)  NSAIDs (Anaphylaxis; Urticaria; Hives)    Intolerances        SOCIAL HISTORY:    FAMILY HISTORY:      Vital Signs Last 24 Hrs  T(C): 36.7 (08 Feb 2019 12:00), Max: 37 (07 Feb 2019 20:00)  T(F): 98.1 (08 Feb 2019 12:00), Max: 98.6 (07 Feb 2019 20:00)  HR: 80 (08 Feb 2019 12:00) (76 - 107)  BP: 132/81 (08 Feb 2019 11:00) (114/86 - 159/81)  BP(mean): 92 (08 Feb 2019 11:00) (83 - 103)  RR: 15 (08 Feb 2019 12:00) (10 - 24)  SpO2: 96% (08 Feb 2019 12:00) (93% - 100%)    PHYSICAL EXAM:     The patient was alert and oriented X 3, well nourished, and in no  apparent distress.  OP showed no ulcerations  There was no visible lymphadenopathy.  Conjunctiva were non injected  There was no clubbing or edema of extremities.  The scalp, hair, face, eyebrows, lips, OP, neck, chest, back,   extremities X 4, nails were examined.  There was no hyperhidrosis or bromhidrosis.    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  Dermatology consulted for lesion on nose which has been growing, present for 4 weeks, asymptomatic. Denies prior history of skin cancer but has had actinic keratoses treated with Efudex in the past.  Does not have dermatologist that he follows with regularly.         PAST MEDICAL & SURGICAL HISTORY:  Subdural hematoma  Lymphoma in remission: Tonsillar large cell lymphoma in remission since 2015. [place] [facility]  BPH (benign prostatic hyperplasia)  Gout  Hyperlipidemia  Essential hypertension  History of hip replacement      REVIEW OF SYSTEMS      General: no fevers/chills, no lethargy	    Skin/Breast: see HPI  	  Ophthalmologic: no eye pain or change in vision  	  ENMT: no dysphagia or change in hearing    Respiratory and Thorax: no SOB or cough  	  Cardiovascular: no palpitations or chest pain    Gastrointestinal: no abdominal pain or blood in stool     Genitourinary: no dysuria or frequency    Musculoskeletal: no joint pains or weakness	    Neurological: no weakness, numbness, or tingling    MEDICATIONS  (STANDING):  allopurinol 300 milliGRAM(s) Oral daily  amLODIPine   Tablet 5 milliGRAM(s) Oral daily  atorvastatin 80 milliGRAM(s) Oral at bedtime  ceFAZolin   IVPB 2000 milliGRAM(s) IV Intermittent every 8 hours  chlorhexidine 4% Liquid 1 Application(s) Topical <User Schedule>  lactated ringers. 1000 milliLiter(s) (60 mL/Hr) IV Continuous <Continuous>  levETIRAcetam 500 milliGRAM(s) Oral two times a day  tamsulosin 0.4 milliGRAM(s) Oral at bedtime    MEDICATIONS  (PRN):  acetaminophen   Tablet .. 650 milliGRAM(s) Oral every 6 hours PRN Mild Pain (1 - 3)  oxyCODONE    5 mG/acetaminophen 325 mG 1 Tablet(s) Oral every 4 hours PRN Moderate Pain (4 - 6)      Allergies    aspirin (Rash; Urticaria; Hives)  Motrin (Rash)  NSAIDs (Anaphylaxis; Urticaria; Hives)    Intolerances        SOCIAL HISTORY:    FAMILY HISTORY:      Vital Signs Last 24 Hrs  T(C): 36.7 (08 Feb 2019 12:00), Max: 37 (07 Feb 2019 20:00)  T(F): 98.1 (08 Feb 2019 12:00), Max: 98.6 (07 Feb 2019 20:00)  HR: 80 (08 Feb 2019 12:00) (76 - 107)  BP: 132/81 (08 Feb 2019 11:00) (114/86 - 159/81)  BP(mean): 92 (08 Feb 2019 11:00) (83 - 103)  RR: 15 (08 Feb 2019 12:00) (10 - 24)  SpO2: 96% (08 Feb 2019 12:00) (93% - 100%)    PHYSICAL EXAM:     The patient was alert and oriented X 3, well nourished, and in no  apparent distress.  OP showed no ulcerations  There was no visible lymphadenopathy.  Conjunctiva were non injected  There was no clubbing or edema of extremities.  The scalp, hair, face, eyebrows, lips, OP, neck, chest, back,   extremities X 4, nails were examined.  There was no hyperhidrosis or bromhidrosis.    Of note on skin exam: 1cm pink papule with rolled borders and central ulceration on nasal bridge       LABS:                        15.6   7.56  )-----------( 212      ( 08 Feb 2019 05:00 )             44.5     02-08    138  |  100  |  16  ----------------------------<  149<H>  4.3   |  20<L>  |  1.12    Ca    9.3      08 Feb 2019 05:00  Phos  4.0     02-08  Mg     1.9     02-08    April Figueroa MD (PGY-3)   Dermatology Resident  [facility]  Pager: [phone]  Office [phone]

## 2019-02-08 NOTE — CONSULT NOTE ADULT - ASSESSMENT
82 yo M with neoplasm of uncertain behavior, clinical appearance c/f basal cell carcinoma (BCC)    1. Neoplasm of uncertain behavior, likely BCC   -Patient will need shave biopsy of lesion performed as an outpatient   -Reviewed likely diagnosis with patient and that treatment would require further surgery beyond biopsy if BCC, most likely would recommend Mohs surgery given location  -Patient should follow up with [facility] at [address] [place] [address], [address], [place] [address].  Phone number: [phone]  -Obtained contact information, will have scheduling from our office call patient to make an appointment for biopsy and full body skin exam 82 yo M with neoplasm of uncertain behavior, clinical appearance c/f basal cell carcinoma (BCC)    1. Neoplasm of uncertain behavior, likely BCC   -Patient will need shave biopsy of lesion performed as an outpatient   -Reviewed likely diagnosis with patient and that treatment would require further surgery beyond biopsy if BCC, most likely would recommend Mohs surgery given location  -Patient should follow up with [facility] at [address] [place] [address], [address], [place] [address].  Phone number: [phone]  -Obtained contact information, will have scheduling from our office call patient to make an appointment for biopsy and full body skin exam     April Figueroa MD (PGY-3)   Dermatology Resident  [facility]  Pager: [phone]  Office [phone]

## 2019-02-09 ENCOUNTER — TRANSCRIPTION ENCOUNTER (OUTPATIENT)
Age: 84
End: 2019-02-09

## 2019-02-09 DIAGNOSIS — Z98.890 OTHER SPECIFIED POSTPROCEDURAL STATES: ICD-10-CM

## 2019-02-09 LAB
ANION GAP SERPL CALC-SCNC: 15 MMO/L — HIGH (ref 7–14)
BASOPHILS # BLD AUTO: 0.03 K/UL — SIGNIFICANT CHANGE UP (ref 0–0.2)
BASOPHILS NFR BLD AUTO: 0.4 % — SIGNIFICANT CHANGE UP (ref 0–2)
BUN SERPL-MCNC: 17 MG/DL — SIGNIFICANT CHANGE UP (ref 7–23)
CALCIUM SERPL-MCNC: 9.7 MG/DL — SIGNIFICANT CHANGE UP (ref 8.4–10.5)
CHLORIDE SERPL-SCNC: 100 MMOL/L — SIGNIFICANT CHANGE UP (ref 98–107)
CO2 SERPL-SCNC: 24 MMOL/L — SIGNIFICANT CHANGE UP (ref 22–31)
CREAT SERPL-MCNC: 1.13 MG/DL — SIGNIFICANT CHANGE UP (ref 0.5–1.3)
EOSINOPHIL # BLD AUTO: 0.09 K/UL — SIGNIFICANT CHANGE UP (ref 0–0.5)
EOSINOPHIL NFR BLD AUTO: 1.1 % — SIGNIFICANT CHANGE UP (ref 0–6)
GLUCOSE SERPL-MCNC: 122 MG/DL — HIGH (ref 70–99)
HCT VFR BLD CALC: 45 % — SIGNIFICANT CHANGE UP (ref 39–50)
HGB BLD-MCNC: 15.7 G/DL — SIGNIFICANT CHANGE UP (ref 13–17)
IMM GRANULOCYTES NFR BLD AUTO: 0.4 % — SIGNIFICANT CHANGE UP (ref 0–1.5)
LYMPHOCYTES # BLD AUTO: 1.67 K/UL — SIGNIFICANT CHANGE UP (ref 1–3.3)
LYMPHOCYTES # BLD AUTO: 20.6 % — SIGNIFICANT CHANGE UP (ref 13–44)
MAGNESIUM SERPL-MCNC: 2.1 MG/DL — SIGNIFICANT CHANGE UP (ref 1.6–2.6)
MCHC RBC-ENTMCNC: 31.8 PG — SIGNIFICANT CHANGE UP (ref 27–34)
MCHC RBC-ENTMCNC: 34.9 % — SIGNIFICANT CHANGE UP (ref 32–36)
MCV RBC AUTO: 91.1 FL — SIGNIFICANT CHANGE UP (ref 80–100)
MONOCYTES # BLD AUTO: 0.62 K/UL — SIGNIFICANT CHANGE UP (ref 0–0.9)
MONOCYTES NFR BLD AUTO: 7.7 % — SIGNIFICANT CHANGE UP (ref 2–14)
NEUTROPHILS # BLD AUTO: 5.66 K/UL — SIGNIFICANT CHANGE UP (ref 1.8–7.4)
NEUTROPHILS NFR BLD AUTO: 69.8 % — SIGNIFICANT CHANGE UP (ref 43–77)
NRBC # FLD: 0 K/UL — LOW (ref 25–125)
PHOSPHATE SERPL-MCNC: 2.9 MG/DL — SIGNIFICANT CHANGE UP (ref 2.5–4.5)
PLATELET # BLD AUTO: 208 K/UL — SIGNIFICANT CHANGE UP (ref 150–400)
PMV BLD: 10.1 FL — SIGNIFICANT CHANGE UP (ref 7–13)
POTASSIUM SERPL-MCNC: 3.8 MMOL/L — SIGNIFICANT CHANGE UP (ref 3.5–5.3)
POTASSIUM SERPL-SCNC: 3.8 MMOL/L — SIGNIFICANT CHANGE UP (ref 3.5–5.3)
RBC # BLD: 4.94 M/UL — SIGNIFICANT CHANGE UP (ref 4.2–5.8)
RBC # FLD: 12.8 % — SIGNIFICANT CHANGE UP (ref 10.3–14.5)
SODIUM SERPL-SCNC: 139 MMOL/L — SIGNIFICANT CHANGE UP (ref 135–145)
WBC # BLD: 8.1 K/UL — SIGNIFICANT CHANGE UP (ref 3.8–10.5)
WBC # FLD AUTO: 8.1 K/UL — SIGNIFICANT CHANGE UP (ref 3.8–10.5)

## 2019-02-09 PROCEDURE — 70450 CT HEAD/BRAIN W/O DYE: CPT | Mod: 26

## 2019-02-09 PROCEDURE — 99233 SBSQ HOSP IP/OBS HIGH 50: CPT | Mod: GC

## 2019-02-09 RX ORDER — ROSUVASTATIN CALCIUM 5 MG/1
1 TABLET ORAL
Qty: 0 | Refills: 0 | COMMUNITY

## 2019-02-09 RX ORDER — LEVETIRACETAM 250 MG/1
1 TABLET, FILM COATED ORAL
Qty: 14 | Refills: 0 | OUTPATIENT
Start: 2019-02-09 | End: 2019-02-15

## 2019-02-09 RX ORDER — ACETAMINOPHEN 500 MG
2 TABLET ORAL
Qty: 0 | Refills: 0 | COMMUNITY
Start: 2019-02-09

## 2019-02-09 RX ORDER — ATORVASTATIN CALCIUM 80 MG/1
1 TABLET, FILM COATED ORAL
Qty: 0 | Refills: 0 | COMMUNITY
Start: 2019-02-09

## 2019-02-09 RX ADMIN — Medication 300 MILLIGRAM(S): at 11:28

## 2019-02-09 RX ADMIN — ATORVASTATIN CALCIUM 80 MILLIGRAM(S): 80 TABLET, FILM COATED ORAL at 21:42

## 2019-02-09 RX ADMIN — LEVETIRACETAM 500 MILLIGRAM(S): 250 TABLET, FILM COATED ORAL at 17:03

## 2019-02-09 RX ADMIN — Medication 650 MILLIGRAM(S): at 12:00

## 2019-02-09 RX ADMIN — TAMSULOSIN HYDROCHLORIDE 0.4 MILLIGRAM(S): 0.4 CAPSULE ORAL at 21:42

## 2019-02-09 RX ADMIN — Medication 650 MILLIGRAM(S): at 11:30

## 2019-02-09 RX ADMIN — LEVETIRACETAM 500 MILLIGRAM(S): 250 TABLET, FILM COATED ORAL at 05:40

## 2019-02-09 RX ADMIN — AMLODIPINE BESYLATE 5 MILLIGRAM(S): 2.5 TABLET ORAL at 05:40

## 2019-02-09 NOTE — DISCHARGE NOTE ADULT - INSTRUCTIONS
1. Remove top surgical dressing on post operative day 3 unless it was removed by the surgical team prior to your discharge. Incision should be left uncovered after day 3.   2. Begin showering with shampoo on post operative day 4. Avoid long soaks and do not submerge incision in bathtub. Regular shower only and allow soap and water to run over the incision. Pat incision area dry with clean towel- do not scrub. Please shower regularly to ensure incision stays clean to avoid post operative infections.   3. Notify your surgeon if you notice increased redness, drainage or you notice incision area opening.   4. Return to ER immediately for high fevers, severe headache, vomiting, lethargy or  weakness  5. Please call your neurosurgeon following discharge to make follow up appointment in 1 week after discharge unless otherwise specified.   6. Post operative pain medication are sent to [facility](unless otherwise specified)- Located in [facility]. All post operative prescriptions should be picked up before departing the hospital  7. Ambulate as tolerate. Continue with all "activities of daily living". Avoid strenuous activity or lifting more than 10 pounds until cleared for additional activity at your follow up appointment  8. Do not return to work or school until cleared by your neurosurgeon at your follow up visit unless specified to you during your hospital stay keep incision site clean and dry

## 2019-02-09 NOTE — PROGRESS NOTE ADULT - SUBJECTIVE AND OBJECTIVE BOX
No issues overnight  ICU Vital Signs Last 24 Hrs  T(C): 35.8 (09 Feb 2019 00:00), Max: 36.7 (08 Feb 2019 12:00)  T(F): 96.4 (09 Feb 2019 00:00), Max: 98.1 (08 Feb 2019 12:00)  HR: 72 (09 Feb 2019 00:00) (67 - 105)  BP: 153/83 (09 Feb 2019 00:00) (114/86 - 163/85)  BP(mean): 98 (09 Feb 2019 00:00) (73 - 103)  ABP: --  ABP(mean): --  RR: 16 (09 Feb 2019 00:00) (11 - 24)  SpO2: 98% (09 Feb 2019 00:00) (94% - 99%)    AAO X 3  PERRLA, EOMI  CN 2-12 grossly intact  WARREN strength 5/5  No drift  SILT    Drain: PERICO with minimal drainage    MEDICATIONS  (STANDING):  allopurinol 300 milliGRAM(s) Oral daily  amLODIPine   Tablet 5 milliGRAM(s) Oral daily  atorvastatin 80 milliGRAM(s) Oral at bedtime  chlorhexidine 4% Liquid 1 Application(s) Topical <User Schedule>  lactated ringers. 1000 milliLiter(s) (60 mL/Hr) IV Continuous <Continuous>  levETIRAcetam 500 milliGRAM(s) Oral two times a day  tamsulosin 0.4 milliGRAM(s) Oral at bedtime    MEDICATIONS  (PRN):  acetaminophen   Tablet .. 650 milliGRAM(s) Oral every 6 hours PRN Mild Pain (1 - 3)  oxyCODONE    5 mG/acetaminophen 325 mG 1 Tablet(s) Oral every 4 hours PRN Moderate Pain (4 - 6)                          15.7   8.10  )-----------( 208      ( 09 Feb 2019 03:00 )             45.0     02-08    138  |  100  |  16  ----------------------------<  149<H>  4.3   |  20<L>  |  1.12    Ca    9.3      08 Feb 2019 05:00  Phos  4.0     02-08  Mg     1.9     02-08    < from: CT Head No Cont (02.07.19 @ 20:31) >    Interval placement of a right frontal diallo hole and right frontal   subdural drainage catheter with new pneumocephalus and interval decrease   in size of the right holohemispheric mixed density subdural collection   now 1.2 cm in transverse dimension, previously 2.2 cm. There is interval   decreased mass effect along the right lateral ventricle with improved   delineation of the frontal and temporal horns. There is decreased right   to left midline shift now only 2 mm, previously 4 mm. There is unchanged   enlargement of ventricles and sulci consistent with volume loss and   hemispheric white matter areas of low attenuation which are nonspecific   but likely related to sequelae of microvascular disease. There is no new   intraparenchymal hematoma, mass effect or midline shift. No new abnormal   extra-axial fluid collections are present. Basal cisterns appear patent.    There is redemonstration of mucosal thickening with opacification and   small air-fluid level in the left maxillary sinus, underlying maxillary   sinusitis not excluded, there is also continued dorsal thickening and   obstruction of the left frontoethmoidal recess with a completely   opacified left frontal sinus and left frontal sinus mucocele containing   areas of increased attenuation, supervised infection with fungal not   excluded.    IMPRESSION:    Interval new right frontal diallo hole and frontal subdural drainage   catheter with decreased size of right holohemispheric subdural collection   now 1.2 cm previously 2.2 cm with decreased leftward shift.    < end of copied text >

## 2019-02-09 NOTE — DISCHARGE NOTE ADULT - PATIENT PORTAL LINK FT
You can access the [facility] Patient Portal, offered by [facility], by registering with the following website: [email or web address]

## 2019-02-09 NOTE — DISCHARGE NOTE ADULT - REASON FOR ADMISSION
Transfer from [facility] for subacute on chronic subdural hematoma s/p fall 1 week ago

## 2019-02-09 NOTE — PROGRESS NOTE ADULT - ASSESSMENT
ASSESSMENT:  83M with chronic SDH s/p diallo hole, evacuation, and PERICO placement; SICU consulted for neurologic monitoring       PLAN:    Neurologic:   -keppra for seizure ppx  -q4 hr neuro checks  -Tylenol, dilaudid, oxy prn for pain  -continue sertaline for anxiety  -CTH in AM     Respiratory:   -currently on NC, saturating 100%  -supplemental oxygen PRN    Cardiovascular:   -continue home amlodipine for HTN  -hemodynamically stable     Gastrointestinal/Nutrition:   -tolerating reg diet  -monitor GIF    Renal/Genitourinary:   -monitor I&Os  -continue home Flomax  -replete electrolytes PRN     Hematologic:   -H/H stable  -monitor CBC  -hold on chemical VTE ppx for now    Infectious Disease:   -Ancef for ppx while PERICO in place     Endocrine:   -continue home lipitor and allopurinol     Disposition: SICU  --------------------------------------------------------------------------------------    Critical Care Diagnoses: SDH, craniotomy

## 2019-02-09 NOTE — DISCHARGE NOTE ADULT - ADDITIONAL INSTRUCTIONS
Please Call Dr. Moses's office to Schedule a Follow up appointment  Please Call Dermatology Clinic [phone] to schedule a follow up appointment regarding lesion on your nose

## 2019-02-09 NOTE — DISCHARGE NOTE ADULT - CARE PLAN
Principal Discharge DX:	Subdural hematoma  Goal:	To Recover from Surgery at home  Assessment and plan of treatment:	Please Call Dr. Moses's office on Monday to Schedule a follow up appointment within one week. Staples will be removed at your follow up appointment

## 2019-02-09 NOTE — DISCHARGE NOTE ADULT - CARE PROVIDERS DIRECT ADDRESSES
[email or web address]

## 2019-02-09 NOTE — PROGRESS NOTE ADULT - ATTENDING COMMENTS
Current Issues:  S06.5X9A Subdural hematoma   - s/p repeat CTH this am, official read pending.  Plan for likely drain removal as per neurosurg  I10 Hypertension, unspecified type   - controlled on current regimen  E78.5 Hyperlipidemia, unspecified hyperlipidemia type   - on statin  M10.9 Gout, unspecified cause, unspecified chronicity, unspecified site   - on home allopurinol  Z91.89 At risk for malnutrition   - on diet

## 2019-02-09 NOTE — PROGRESS NOTE ADULT - SUBJECTIVE AND OBJECTIVE BOX
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 Around 1 month ago, patient was having difficulty walking with gait wide based. Patient has had 2 falls in the last month, most recent was one week ago while walking to his car. Patient does not remember if he hit his head. During this fall, he injured his right arm and right side of the neck. [facility] demonstrated right frontoparietal subdural collection with subacute to chronic hemorrhagic products measuring 2.2cm with 3.6cm of midline shift. Patient was transferred to [facility] for intervention. Pt s/p right diallo hole for evac of chronic SDH with PERICO X 1, patient extubated post-op.  SICU consulted for q1h neurochecks.      Allergies: aspirin (Rash; Urticaria; Hives)  Motrin (Rash)  NSAIDs (Anaphylaxis; Urticaria; Hives)      MEDICATIONS:   --------------------------------------------------------------------------------------  Neurologic Medications  acetaminophen   Tablet .. 650 milliGRAM(s) Oral every 6 hours PRN Mild Pain (1 - 3)  levETIRAcetam 500 milliGRAM(s) Oral two times a day  oxyCODONE    5 mG/acetaminophen 325 mG 1 Tablet(s) Oral every 4 hours PRN Moderate Pain (4 - 6)    Respiratory Medications    Cardiovascular Medications  amLODIPine   Tablet 5 milliGRAM(s) Oral daily  tamsulosin 0.4 milliGRAM(s) Oral at bedtime    Gastrointestinal Medications  lactated ringers. 1000 milliLiter(s) IV Continuous <Continuous>    Genitourinary Medications    Hematologic/Oncologic Medications    Antimicrobial/Immunologic Medications    Endocrine/Metabolic Medications  allopurinol 300 milliGRAM(s) Oral daily  atorvastatin 80 milliGRAM(s) Oral at bedtime    Topical/Other Medications  chlorhexidine 4% Liquid 1 Application(s) Topical <User Schedule>    --------------------------------------------------------------------------------------  ICU Vital Signs Last 24 Hrs  T(C): 36.1 (08 Feb 2019 20:00), Max: 36.7 (08 Feb 2019 12:00)  T(F): 96.9 (08 Feb 2019 20:00), Max: 98.1 (08 Feb 2019 12:00)  HR: 80 (08 Feb 2019 20:00) (67 - 105)  BP: 163/85 (08 Feb 2019 20:00) (114/86 - 163/85)  BP(mean): 103 (08 Feb 2019 20:00) (73 - 103)  ABP: --  ABP(mean): --  RR: 17 (08 Feb 2019 20:00) (11 - 24)  SpO2: 96% (08 Feb 2019 20:00) (94% - 100%)    --------------------------------------------------------------------------------------  I&O's Detail    07 Feb 2019 07:01  -  08 Feb 2019 07:00  --------------------------------------------------------  IN:    IV PiggyBack: 50 mL    lactated ringers.: 150 mL    Oral Fluid: 660 mL    sodium chloride 0.9%: 660 mL  Total IN: 1520 mL    OUT:    Bulb: 55 mL    Voided: 1225 mL  Total OUT: 1280 mL    Total NET: 240 mL      08 Feb 2019 07:01  -  09 Feb 2019 00:03  --------------------------------------------------------  IN:    lactated ringers.: 960 mL  Total IN: 960 mL    OUT:    Voided: 500 mL  Total OUT: 500 mL    Total NET: 460 mL        --------------------------------------------------------------------------------------    EXAM  NEUROLOGY  Exam: Normal, NAD, alert, oriented x3, no focal deficits.     HEENT  PERICO X 1 with serosanguinous output.     RESPIRATORY  Exam: Lungs clear to auscultation, Normal expansion/effort    CARDIOVASCULAR  Exam: S1, S2.  Regular rate and rhythm.       GI/NUTRITION  Exam: Abdomen soft, Non-tender, Non-distended.    VASCULAR  Exam: Extremities warm, pink, well-perfused.     MUSCULOSKELETAL  Exam: All extremities moving spontaneously without limitations.     METABOLIC/FLUIDS/ELECTROLYTES  lactated ringers. 1000 milliLiter(s) IV Continuous <Continuous>      HEMATOLOGIC  [x] VTE Prophylaxis:     INFECTIOUS DISEASE  Antimicrobials/Immunologic Medications:    Tubes/Lines/Drains    [x] Peripheral IV    [x] Necessity of urinary, arterial, and venous catheters discussed    LABS  --------------------------------------------------------------------------------------   CBC (02-08 @ 05:00)                              15.6                           7.56    )----------------(  212        87.4<H>% Neutrophils, 8.7<L>% Lymphocytes, ANC: 6.60                                44.5      BMP (02-08 @ 05:00)             138     |  100     |  16    		Ca++ --      Ca 9.3                ---------------------------------( 149<H>		Mg 1.9                4.3     |  20<L>   |  1.12  			Ph 4.0             --------------------------------------------------------------------------------------        ASSESSMENT:  83M with chronic SDH s/p diallo hole, evacuation, and PERICO placement; SICU consulted for neurologic monitoring       PLAN:    Neurologic:   -keppra for seizure ppx  -q2 hr neuro checks  -Tylenol, dilaudid, oxy prn for pain  -continue sertaline for anxiety  -CTH in AM     Respiratory:   -currently on NC, saturating 100%  -supplemental oxygen PRN    Cardiovascular:   -continue home amlodipine for HTN  -hemodynamically stable     Gastrointestinal/Nutrition:   -tolerating reg diet  -monitor GIF    Renal/Genitourinary:   -monitor I&Os  -continue home Flomax  -replete electrolytes PRN     Hematologic:   -H/H stable  -monitor CBC  -hold on chemical VTE ppx for now    Infectious Disease:   -Ancef for ppx while PERICO in place     Endocrine:   -continue home lipitor and allopurinol     Disposition: SICU  --------------------------------------------------------------------------------------    Critical Care Diagnoses: SDH, craniotomy SICU Daily Progress Note  =====================================================  Interval/Overnight Events:  LEAH overnight    83y Male with medical history of OA, BPH, Gout, HTN, HLD, 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 Around 1 month ago, patient was having difficulty walking with gait wide based. Patient has had 2 falls in the last month, most recent was one week ago while walking to his car. Patient does not remember if he hit his head. During this fall, he injured his right arm and right side of the neck. [facility] head demonstrated right frontoparietal subdural collection with subacute to chronic hemorrhagic products measuring 2.2cm with 3.6cm of midline shift. Patient was transferred to [facility] for intervention. Pt s/p right diallo hole for evac of chronic SDH with PERICO X 1, patient extubated post-op.  SICU consulted for q1h neurochecks.      Allergies: aspirin (Rash; Urticaria; Hives)  Motrin (Rash)  NSAIDs (Anaphylaxis; Urticaria; Hives)      MEDICATIONS:   --------------------------------------------------------------------------------------  Neurologic Medications  acetaminophen   Tablet .. 650 milliGRAM(s) Oral every 6 hours PRN Mild Pain (1 - 3)  levETIRAcetam 500 milliGRAM(s) Oral two times a day  oxyCODONE    5 mG/acetaminophen 325 mG 1 Tablet(s) Oral every 4 hours PRN Moderate Pain (4 - 6)    Respiratory Medications    Cardiovascular Medications  amLODIPine   Tablet 5 milliGRAM(s) Oral daily  tamsulosin 0.4 milliGRAM(s) Oral at bedtime    Gastrointestinal Medications  lactated ringers. 1000 milliLiter(s) IV Continuous <Continuous>    Genitourinary Medications    Hematologic/Oncologic Medications    Antimicrobial/Immunologic Medications    Endocrine/Metabolic Medications  allopurinol 300 milliGRAM(s) Oral daily  atorvastatin 80 milliGRAM(s) Oral at bedtime    Topical/Other Medications  chlorhexidine 4% Liquid 1 Application(s) Topical <User Schedule>    --------------------------------------------------------------------------------------  ICU Vital Signs Last 24 Hrs  T(C): 36.1 (08 Feb 2019 20:00), Max: 36.7 (08 Feb 2019 12:00)  T(F): 96.9 (08 Feb 2019 20:00), Max: 98.1 (08 Feb 2019 12:00)  HR: 80 (08 Feb 2019 20:00) (67 - 105)  BP: 163/85 (08 Feb 2019 20:00) (114/86 - 163/85)  BP(mean): 103 (08 Feb 2019 20:00) (73 - 103)  ABP: --  ABP(mean): --  RR: 17 (08 Feb 2019 20:00) (11 - 24)  SpO2: 96% (08 Feb 2019 20:00) (94% - 100%)    --------------------------------------------------------------------------------------  I&O's Detail    07 Feb 2019 07:01  -  08 Feb 2019 07:00  --------------------------------------------------------  IN:    IV PiggyBack: 50 mL    lactated ringers.: 150 mL    Oral Fluid: 660 mL    sodium chloride 0.9%: 660 mL  Total IN: 1520 mL    OUT:    Bulb: 55 mL    Voided: 1225 mL  Total OUT: 1280 mL    Total NET: 240 mL      08 Feb 2019 07:01  -  09 Feb 2019 00:03  --------------------------------------------------------  IN:    lactated ringers.: 960 mL  Total IN: 960 mL    OUT:    Voided: 500 mL  Total OUT: 500 mL    Total NET: 460 mL        --------------------------------------------------------------------------------------    EXAM  NEUROLOGY  Exam: Normal, NAD, alert, oriented x3, no focal deficits.     HEENT  PERICO X 1 with serosanguinous output.     RESPIRATORY  Exam: Lungs clear to auscultation, Normal expansion/effort    CARDIOVASCULAR  Exam: S1, S2.  Regular rate and rhythm.       GI/NUTRITION  Exam: Abdomen soft, Non-tender, Non-distended.    VASCULAR  Exam: Extremities warm, pink, well-perfused.     MUSCULOSKELETAL  Exam: All extremities moving spontaneously without limitations.     METABOLIC/FLUIDS/ELECTROLYTES  lactated ringers. 1000 milliLiter(s) IV Continuous <Continuous>      HEMATOLOGIC  [x] VTE Prophylaxis:     INFECTIOUS DISEASE  Antimicrobials/Immunologic Medications:    Tubes/Lines/Drains    [x] Peripheral IV    [x] Necessity of urinary, arterial, and venous catheters discussed    LABS  --------------------------------------------------------------------------------------   CBC (02-08 @ 05:00)                              15.6                           7.56    )----------------(  212        87.4<H>% Neutrophils, 8.7<L>% Lymphocytes, ANC: 6.60                                44.5      BMP (02-08 @ 05:00)             138     |  100     |  16    		Ca++ --      Ca 9.3                ---------------------------------( 149<H>		Mg 1.9                4.3     |  20<L>   |  1.12  			Ph 4.0             --------------------------------------------------------------------------------------

## 2019-02-09 NOTE — DISCHARGE NOTE ADULT - MEDICATION SUMMARY - MEDICATIONS TO TAKE
I will START or STAY ON the medications listed below when I get home from the hospital:    acetaminophen 325 mg oral tablet  -- 2 tab(s) by mouth every 6 hours, As needed, Mild Pain (1 - 3)  -- Indication: For for mild pain/fever    tamsulosin 0.4 mg oral capsule  -- 1 cap(s) by mouth once a day  -- Indication: For Home med    allopurinol 300 mg oral tablet  -- 1 tab(s) by mouth once a day  -- Indication: For  home med    atorvastatin 80 mg oral tablet  -- 1 tab(s) by mouth once a day (at bedtime)  -- Indication: For  home med    amLODIPine 5 mg oral tablet  -- 1 tab(s) by mouth once a day  -- Indication: For  home med I will START or STAY ON the medications listed below when I get home from the hospital:    acetaminophen 325 mg oral tablet  -- 2 tab(s) by mouth every 6 hours, As needed, Mild Pain (1 - 3)  -- Indication: For for mild pain/fever    tamsulosin 0.4 mg oral capsule  -- 1 cap(s) by mouth once a day  -- Indication: For Home med    levETIRAcetam 500 mg oral tablet  -- 1 tab(s) by mouth 2 times a day  -- Indication: For Seizure ppx    allopurinol 300 mg oral tablet  -- 1 tab(s) by mouth once a day  -- Indication: For  home med    atorvastatin 80 mg oral tablet  -- 1 tab(s) by mouth once a day (at bedtime)  -- Indication: For  home med    amLODIPine 5 mg oral tablet  -- 1 tab(s) by mouth once a day  -- Indication: For  home med

## 2019-02-09 NOTE — DISCHARGE NOTE ADULT - CARE PROVIDER_API CALL
Hans Moess)  Neurological Surgery; Pediatric Neurological Surgery  [address], [address]  [place], [place] [id number]  Phone: [phone]  Fax: [phone]  Follow Up Time:     Felecia Salinas)  Dermatology  [address], [address]  [place] [address]  Phone: [phone]  Fax: [phone]  Follow Up Time:

## 2019-02-09 NOTE — DISCHARGE NOTE ADULT - HOSPITAL COURSE
83 year old Male with medical history of OA, BPH, Gout, HTN, HLD, Tonsilar large cell lymphoma (Treated with chemotherapy @ [place] and in remission since 2015), R hip replacement, presented to [facility] on 2/4 complaining of unsteady gait after falling about 1 week ago. Around 1 month ago, patient was having difficulty walking with gait wide based. Patient has had 2 falls in the last month, most recent was one week ago while walking to his car. Patient does not remember if he hit his head. During this fall, he injured his right arm and right side of the neck. [facility] In [facility] ER. CT Cervical spine negative for fracture, CT head demonstrated Right frontoparietal subdural collection with subacute to chronic hemorrhagic products measuring 2.2cm with 3.6cm of midline shift. Neurosurgery contacted at [facility] who suggested admission to ICU for neuro checks q 1 hour and possibly transfer to [facility] on 2/5. Patient was referred to Dr. Moses for consulted who accepted direct admit to [facility]. Pt went to the OR 2/7 for Right diallo hold for evacuation of SDH. He was monitored in SICU post operatively and continued to do well. Now stable for d/c home

## 2019-02-10 VITALS
RESPIRATION RATE: 18 BRPM | DIASTOLIC BLOOD PRESSURE: 67 MMHG | TEMPERATURE: 98 F | HEART RATE: 71 BPM | OXYGEN SATURATION: 97 % | SYSTOLIC BLOOD PRESSURE: 127 MMHG

## 2019-02-10 LAB
ANION GAP SERPL CALC-SCNC: 13 MMO/L — SIGNIFICANT CHANGE UP (ref 7–14)
BUN SERPL-MCNC: 20 MG/DL — SIGNIFICANT CHANGE UP (ref 7–23)
CALCIUM SERPL-MCNC: 9.6 MG/DL — SIGNIFICANT CHANGE UP (ref 8.4–10.5)
CHLORIDE SERPL-SCNC: 101 MMOL/L — SIGNIFICANT CHANGE UP (ref 98–107)
CO2 SERPL-SCNC: 25 MMOL/L — SIGNIFICANT CHANGE UP (ref 22–31)
CREAT SERPL-MCNC: 1.22 MG/DL — SIGNIFICANT CHANGE UP (ref 0.5–1.3)
GLUCOSE SERPL-MCNC: 116 MG/DL — HIGH (ref 70–99)
HCT VFR BLD CALC: 44.1 % — SIGNIFICANT CHANGE UP (ref 39–50)
HGB BLD-MCNC: 14.8 G/DL — SIGNIFICANT CHANGE UP (ref 13–17)
MAGNESIUM SERPL-MCNC: 2.1 MG/DL — SIGNIFICANT CHANGE UP (ref 1.6–2.6)
MCHC RBC-ENTMCNC: 31 PG — SIGNIFICANT CHANGE UP (ref 27–34)
MCHC RBC-ENTMCNC: 33.6 % — SIGNIFICANT CHANGE UP (ref 32–36)
MCV RBC AUTO: 92.5 FL — SIGNIFICANT CHANGE UP (ref 80–100)
NRBC # FLD: 0 K/UL — LOW (ref 25–125)
PHOSPHATE SERPL-MCNC: 3 MG/DL — SIGNIFICANT CHANGE UP (ref 2.5–4.5)
PLATELET # BLD AUTO: 210 K/UL — SIGNIFICANT CHANGE UP (ref 150–400)
PMV BLD: 10.6 FL — SIGNIFICANT CHANGE UP (ref 7–13)
POTASSIUM SERPL-MCNC: 4.2 MMOL/L — SIGNIFICANT CHANGE UP (ref 3.5–5.3)
POTASSIUM SERPL-SCNC: 4.2 MMOL/L — SIGNIFICANT CHANGE UP (ref 3.5–5.3)
RBC # BLD: 4.77 M/UL — SIGNIFICANT CHANGE UP (ref 4.2–5.8)
RBC # FLD: 12.9 % — SIGNIFICANT CHANGE UP (ref 10.3–14.5)
SODIUM SERPL-SCNC: 139 MMOL/L — SIGNIFICANT CHANGE UP (ref 135–145)
WBC # BLD: 8.16 K/UL — SIGNIFICANT CHANGE UP (ref 3.8–10.5)
WBC # FLD AUTO: 8.16 K/UL — SIGNIFICANT CHANGE UP (ref 3.8–10.5)

## 2019-02-10 RX ORDER — LEVETIRACETAM 250 MG/1
1 TABLET, FILM COATED ORAL
Qty: 14 | Refills: 0 | OUTPATIENT
Start: 2019-02-10 | End: 2019-02-16

## 2019-02-10 RX ADMIN — AMLODIPINE BESYLATE 5 MILLIGRAM(S): 2.5 TABLET ORAL at 05:22

## 2019-02-10 RX ADMIN — LEVETIRACETAM 500 MILLIGRAM(S): 250 TABLET, FILM COATED ORAL at 05:22

## 2019-02-10 RX ADMIN — Medication 300 MILLIGRAM(S): at 11:16

## 2019-02-10 NOTE — PROGRESS NOTE ADULT - PROBLEM SELECTOR PROBLEM 1
S/P craniotomy
S/P craniotomy
Subdural hematoma
Preop exam for internal medicine

## 2019-02-10 NOTE — PROGRESS NOTE ADULT - PROVIDER SPECIALTY LIST ADULT
Anesthesia
Hospitalist
Neurosurgery
SICU
SICU

## 2019-02-10 NOTE — PROGRESS NOTE ADULT - SUBJECTIVE AND OBJECTIVE BOX
No issues overnight  Vital Signs Last 24 Hrs  T(C): 36.8 (10 Feb 2019 01:06), Max: 36.8 (10 Feb 2019 01:06)  T(F): 98.2 (10 Feb 2019 01:06), Max: 98.2 (10 Feb 2019 01:06)  HR: 77 (10 Feb 2019 01:06) (71 - 114)  BP: 114/67 (10 Feb 2019 01:06) (107/66 - 163/90)  BP(mean): 75 (09 Feb 2019 16:00) (75 - 103)  RR: 16 (10 Feb 2019 01:06) (14 - 20)  SpO2: 96% (10 Feb 2019 01:06) (96% - 100%)    AAO X 3  PERRLA, EOMI  CN 2-12 grossly intact  WARREN strength 5/5  No drift  SILT    MEDICATIONS  (STANDING):  allopurinol 300 milliGRAM(s) Oral daily  amLODIPine   Tablet 5 milliGRAM(s) Oral daily  atorvastatin 80 milliGRAM(s) Oral at bedtime  levETIRAcetam 500 milliGRAM(s) Oral two times a day  tamsulosin 0.4 milliGRAM(s) Oral at bedtime    MEDICATIONS  (PRN):  acetaminophen   Tablet .. 650 milliGRAM(s) Oral every 6 hours PRN Mild Pain (1 - 3)  oxyCODONE    5 mG/acetaminophen 325 mG 1 Tablet(s) Oral every 4 hours PRN Moderate Pain (4 - 6)                          15.7   8.10  )-----------( 208      ( 09 Feb 2019 03:00 )             45.0   02-09    139  |  100  |  17  ----------------------------<  122<H>  3.8   |  24  |  1.13    Ca    9.7      09 Feb 2019 03:00  Phos  2.9     02-09  Mg     2.1     02-09    < from: CT Head No Cont (02.09.19 @ 06:40) >    Status post right frontal diallo hole with a surgical plate, subcutaneous   staples and postsurgical changes involving the right frontal soft   tissues. Redemonstration of a drain within the right extra-axial space.    Interval decreased pneumocephalus and decrease in size of the   holohemispheric subdural mixed density collection, now measuring 1 cm in   maximal dimension (2:22), previously 1.2 cm.   There is interval resolution of the right left midline shift.    The ventricles and sulci are again prominent, consistent with age-related   parenchymal volume loss. Patchy hypoattenuation in the   periventricular/subcortical white matter is consistent with microvascular   ischemic change.    There is mucosal thickening and decreased size of the left maxillary   sinus, which may be seen in the setting of chronic sinus disease. There   is also opacification of the left frontal sinus. The mastoid air cells   are clear. Atherosclerosis of the supraclinoid and cavernous internal   carotid arteries.    IMPRESSION:  Right frontal diallo hole with decreased right holohemispheric subdural   collection and pneumocephalus.    < end of copied text >

## 2019-02-10 NOTE — PROGRESS NOTE ADULT - REASON FOR ADMISSION
Transfer from [facility] for subacute on chronic subdural hematoma s/p fall 1 week ago
Transfer from [facility] for subacute on chronic subdural hematoma s/p fall 1 week ago
Transfer from [facility] for subacute on chronic subdural hematoma s/p fall 1 week ago
Transfer from [facility] for subacute on chronic subdural hematoma s/p fall 1 week ago
Transfer from [facility] for subacute on chronic subdural hematoma s/p fall 1 week ago
Transfer from [facility] for subacute on chronic subdural hematoma s/p fall 1 week ago
Transfer from [facility] for subacute on chronic subdural hematoma s/p fall 1 week ago
Transfer from [facility] for subacute on chronic subdural hematoma s/p fall 1 week ago
Transfer from [facility] for subacute on chronic subdural hematoma s/p fall 1 week ago
Transfer from [facility] for subacute on chronic subdural hematoma s/p fall 1 week ago

## 2019-02-10 NOTE — PROGRESS NOTE ADULT - PROBLEM SELECTOR PLAN 1
1. C/w Subdural PERICO drain  2. Hold chemical dvt ppx  3. Neuro checks q 1 hour  4. May advance diet as tolerated
1. Neuro checks q 1 hours  2. Will continue with PERICO drain  3. HOLD chemical dvt prophylaxis
1. Neuro checks q 4 hours  2. Medical Clearance/ EKG today  3. C/w Fabiano  4. Dr. Moses to see today  4. Dr. Moses to see today
1. confirmatory type and screen   2. pending medical clearance   3. EKG  4. Chest Xray   5. Consent   6. NPO after midnight   7. Plan for OR tomorrow afternoon - time TBD  Case discussed with attending neurosurgeon.
Continue neurologic checks  Consider removing drain in AM  Post drain removal CT  Discharge planning
Increase activity  Discharge planning
Keep NPO  Preop for evacuation today
Recommendation: Patient with RBBB on EKG but old after [facility] with [facility], no signs of active ischemia, CP, SOB, or decompensated CHF  -Patient is medically optimized and may proceed to OR without further work-up  -Would consider anesthesia evaluation prior to surgery given patient's need for NGT decompression post-op and significant agitation post-op to determine if general anesthesia is required.

## 2019-02-11 PROBLEM — I10 ESSENTIAL (PRIMARY) HYPERTENSION: Chronic | Status: ACTIVE | Noted: 2019-02-05

## 2019-02-11 PROBLEM — E78.5 HYPERLIPIDEMIA, UNSPECIFIED: Chronic | Status: ACTIVE | Noted: 2019-02-05

## 2019-02-11 PROBLEM — M10.9 GOUT, UNSPECIFIED: Chronic | Status: ACTIVE | Noted: 2019-02-05

## 2019-02-11 PROBLEM — S06.5X9A TRAUMATIC SUBDURAL HEMORRHAGE WITH LOSS OF CONSCIOUSNESS OF UNSPECIFIED DURATION, INITIAL ENCOUNTER: Chronic | Status: ACTIVE | Noted: 2019-02-05

## 2019-02-11 PROBLEM — C85.90 NON-HODGKIN LYMPHOMA, UNSPECIFIED, UNSPECIFIED SITE: Chronic | Status: ACTIVE | Noted: 2019-02-05

## 2019-02-11 PROBLEM — N40.0 BENIGN PROSTATIC HYPERPLASIA WITHOUT LOWER URINARY TRACT SYMPTOMS: Chronic | Status: ACTIVE | Noted: 2019-02-05

## 2019-02-21 ENCOUNTER — LABORATORY RESULT (OUTPATIENT)
Age: 84
End: 2019-02-21

## 2019-02-22 ENCOUNTER — APPOINTMENT (OUTPATIENT)
Dept: DERMATOLOGY | Facility: CLINIC | Age: 84
End: 2019-02-22
Payer: MEDICARE

## 2019-02-22 VITALS
WEIGHT: 165 LBS | HEIGHT: 66 IN | DIASTOLIC BLOOD PRESSURE: 70 MMHG | BODY MASS INDEX: 26.52 KG/M2 | SYSTOLIC BLOOD PRESSURE: 120 MMHG

## 2019-02-22 DIAGNOSIS — Z86.39 PERSONAL HISTORY OF OTHER ENDOCRINE, NUTRITIONAL AND METABOLIC DISEASE: ICD-10-CM

## 2019-02-22 DIAGNOSIS — D48.5 NEOPLASM OF UNCERTAIN BEHAVIOR OF SKIN: ICD-10-CM

## 2019-02-22 DIAGNOSIS — Z91.89 OTHER SPECIFIED PERSONAL RISK FACTORS, NOT ELSEWHERE CLASSIFIED: ICD-10-CM

## 2019-02-22 DIAGNOSIS — Z87.39 PERSONAL HISTORY OF OTHER DISEASES OF THE MUSCULOSKELETAL SYSTEM AND CONNECTIVE TISSUE: ICD-10-CM

## 2019-02-22 DIAGNOSIS — Z80.8 FAMILY HISTORY OF MALIGNANT NEOPLASM OF OTHER ORGANS OR SYSTEMS: ICD-10-CM

## 2019-02-22 DIAGNOSIS — H91.90 UNSPECIFIED HEARING LOSS, UNSPECIFIED EAR: ICD-10-CM

## 2019-02-22 DIAGNOSIS — H54.7 UNSPECIFIED VISUAL LOSS: ICD-10-CM

## 2019-02-22 DIAGNOSIS — N20.0 CALCULUS OF KIDNEY: ICD-10-CM

## 2019-02-22 PROCEDURE — 11102 TANGNTL BX SKIN SINGLE LES: CPT

## 2019-03-02 ENCOUNTER — INPATIENT (INPATIENT)
Facility: HOSPITAL | Age: 84
LOS: 1 days | Discharge: ANOTHER HOSPITAL | End: 2019-03-04
Attending: FAMILY MEDICINE
Payer: MEDICARE

## 2019-03-02 ENCOUNTER — OUTPATIENT (OUTPATIENT)
Dept: OUTPATIENT SERVICES | Facility: HOSPITAL | Age: 84
LOS: 1 days | End: 2019-03-02

## 2019-03-02 DIAGNOSIS — Z96.649 PRESENCE OF UNSPECIFIED ARTIFICIAL HIP JOINT: Chronic | ICD-10-CM

## 2019-03-02 PROCEDURE — 99285 EMERGENCY DEPT VISIT HI MDM: CPT

## 2019-03-02 PROCEDURE — 71045 X-RAY EXAM CHEST 1 VIEW: CPT | Mod: 26

## 2019-03-02 PROCEDURE — 93010 ELECTROCARDIOGRAM REPORT: CPT

## 2019-03-02 PROCEDURE — 99222 1ST HOSP IP/OBS MODERATE 55: CPT

## 2019-03-03 ENCOUNTER — OUTPATIENT (OUTPATIENT)
Dept: OUTPATIENT SERVICES | Facility: HOSPITAL | Age: 84
LOS: 1 days | End: 2019-03-03

## 2019-03-03 DIAGNOSIS — Z96.649 PRESENCE OF UNSPECIFIED ARTIFICIAL HIP JOINT: Chronic | ICD-10-CM

## 2019-03-03 PROCEDURE — 93010 ELECTROCARDIOGRAM REPORT: CPT | Mod: 59

## 2019-03-03 PROCEDURE — 99233 SBSQ HOSP IP/OBS HIGH 50: CPT

## 2019-03-03 PROCEDURE — 93306 TTE W/DOPPLER COMPLETE: CPT | Mod: 26

## 2019-03-03 PROCEDURE — 93458 L HRT ARTERY/VENTRICLE ANGIO: CPT | Mod: 26

## 2019-03-04 ENCOUNTER — INPATIENT (INPATIENT)
Facility: HOSPITAL | Age: 84
LOS: 0 days | Discharge: HOME | DRG: 247 | End: 2019-03-05
Attending: INTERNAL MEDICINE | Admitting: INTERNAL MEDICINE
Payer: MEDICARE

## 2019-03-04 ENCOUNTER — TRANSCRIPTION ENCOUNTER (OUTPATIENT)
Age: 84
End: 2019-03-04

## 2019-03-04 VITALS
SYSTOLIC BLOOD PRESSURE: 155 MMHG | OXYGEN SATURATION: 95 % | TEMPERATURE: 98 F | HEIGHT: 66 IN | HEART RATE: 92 BPM | WEIGHT: 165.35 LBS | DIASTOLIC BLOOD PRESSURE: 77 MMHG | RESPIRATION RATE: 30 BRPM

## 2019-03-04 DIAGNOSIS — I21.4 NON-ST ELEVATION (NSTEMI) MYOCARDIAL INFARCTION: ICD-10-CM

## 2019-03-04 DIAGNOSIS — Z96.649 PRESENCE OF UNSPECIFIED ARTIFICIAL HIP JOINT: Chronic | ICD-10-CM

## 2019-03-04 LAB
BLD GP AB SCN SERPL QL: SIGNIFICANT CHANGE UP
TYPE + AB SCN PNL BLD: SIGNIFICANT CHANGE UP

## 2019-03-04 PROCEDURE — 93010 ELECTROCARDIOGRAM REPORT: CPT

## 2019-03-04 PROCEDURE — 99152 MOD SED SAME PHYS/QHP 5/>YRS: CPT

## 2019-03-04 PROCEDURE — 92941 PRQ TRLML REVSC TOT OCCL AMI: CPT | Mod: RC

## 2019-03-04 PROCEDURE — 92921: CPT | Mod: RC

## 2019-03-04 RX ORDER — ASPIRIN/CALCIUM CARB/MAGNESIUM 324 MG
81 TABLET ORAL DAILY
Qty: 0 | Refills: 0 | Status: DISCONTINUED | OUTPATIENT
Start: 2019-03-04 | End: 2019-03-05

## 2019-03-04 RX ORDER — ACETAMINOPHEN 500 MG
1000 TABLET ORAL ONCE
Qty: 0 | Refills: 0 | Status: COMPLETED | OUTPATIENT
Start: 2019-03-04 | End: 2019-03-04

## 2019-03-04 RX ORDER — CLOPIDOGREL BISULFATE 75 MG/1
75 TABLET, FILM COATED ORAL DAILY
Qty: 0 | Refills: 0 | Status: DISCONTINUED | OUTPATIENT
Start: 2019-03-04 | End: 2019-03-05

## 2019-03-04 RX ORDER — LEVETIRACETAM 250 MG/1
500 TABLET, FILM COATED ORAL
Qty: 0 | Refills: 0 | Status: DISCONTINUED | OUTPATIENT
Start: 2019-03-04 | End: 2019-03-05

## 2019-03-04 RX ORDER — ACETAMINOPHEN 500 MG
650 TABLET ORAL EVERY 6 HOURS
Qty: 0 | Refills: 0 | Status: DISCONTINUED | OUTPATIENT
Start: 2019-03-04 | End: 2019-03-05

## 2019-03-04 RX ORDER — METOPROLOL TARTRATE 50 MG
25 TABLET ORAL
Qty: 0 | Refills: 0 | Status: DISCONTINUED | OUTPATIENT
Start: 2019-03-04 | End: 2019-03-05

## 2019-03-04 RX ORDER — ATORVASTATIN CALCIUM 80 MG/1
80 TABLET, FILM COATED ORAL AT BEDTIME
Qty: 0 | Refills: 0 | Status: DISCONTINUED | OUTPATIENT
Start: 2019-03-04 | End: 2019-03-05

## 2019-03-04 RX ORDER — ALLOPURINOL 300 MG
300 TABLET ORAL DAILY
Qty: 0 | Refills: 0 | Status: DISCONTINUED | OUTPATIENT
Start: 2019-03-04 | End: 2019-03-05

## 2019-03-04 RX ORDER — AMLODIPINE BESYLATE 2.5 MG/1
5 TABLET ORAL DAILY
Qty: 0 | Refills: 0 | Status: DISCONTINUED | OUTPATIENT
Start: 2019-03-04 | End: 2019-03-05

## 2019-03-04 RX ORDER — TAMSULOSIN HYDROCHLORIDE 0.4 MG/1
0.4 CAPSULE ORAL AT BEDTIME
Qty: 0 | Refills: 0 | Status: DISCONTINUED | OUTPATIENT
Start: 2019-03-04 | End: 2019-03-05

## 2019-03-04 RX ADMIN — Medication 400 MILLIGRAM(S): at 17:16

## 2019-03-04 RX ADMIN — LEVETIRACETAM 500 MILLIGRAM(S): 250 TABLET, FILM COATED ORAL at 19:34

## 2019-03-04 RX ADMIN — ATORVASTATIN CALCIUM 80 MILLIGRAM(S): 80 TABLET, FILM COATED ORAL at 21:57

## 2019-03-04 RX ADMIN — TAMSULOSIN HYDROCHLORIDE 0.4 MILLIGRAM(S): 0.4 CAPSULE ORAL at 18:24

## 2019-03-04 RX ADMIN — Medication 1000 MILLIGRAM(S): at 17:55

## 2019-03-04 RX ADMIN — Medication 25 MILLIGRAM(S): at 19:34

## 2019-03-04 NOTE — DISCHARGE NOTE ADULT - PATIENT PORTAL LINK FT
You can access the [facility] Patient Portal, offered by [facility], by registering with the following website: [email or web address]

## 2019-03-04 NOTE — DISCHARGE NOTE ADULT - MEDICATION SUMMARY - MEDICATIONS TO TAKE
I will START or STAY ON the medications listed below when I get home from the hospital:    acetaminophen 325 mg oral tablet  -- 2 tab(s) by mouth every 6 hours, As needed, Mild Pain (1 - 3)  -- Indication: For pain     aspirin 81 mg oral tablet, chewable  -- 1 tab(s) by mouth once a day  -- Indication: For MUST take for stent     tamsulosin 0.4 mg oral capsule  -- 1 cap(s) by mouth once a day  -- Indication: For Voiding     levETIRAcetam 500 mg oral tablet  -- 1 tab(s) by mouth 2 times a day  -- Indication: For anticonvulsant    allopurinol 300 mg oral tablet  -- 1 tab(s) by mouth once a day  -- Indication: For gout    atorvastatin 80 mg oral tablet  -- 1 tab(s) by mouth once a day (at bedtime)  -- Indication: For cholesterol     clopidogrel 75 mg oral tablet  -- 1 tab(s) by mouth once a day  -- Indication: For MUST TAKE FOR STENT     metoprolol tartrate 25 mg oral tablet  -- 1 tab(s) by mouth 2 times a day  -- Indication: For Heart rate     amLODIPine 5 mg oral tablet  -- 1 tab(s) by mouth once a day  -- Indication: For blood pressure

## 2019-03-04 NOTE — DISCHARGE NOTE ADULT - CARE PLAN
Principal Discharge DX:	NSTEMI (non-ST elevated myocardial infarction)  Goal:	Maintain optimal cardiac health  Assessment and plan of treatment:	Restricted use with no heavy lifting of affected arm for 48 hours.  No submerging the arm in water for 48 hours.  You may start showering today.  Call your doctor for any bleeding, swelling, loss of sensation in the hand or fingers, or fingers turning blue.  If heavy bleeding or large lumps form, hold pressure at the spot and come to the Emergency Room. Principal Discharge DX:	NSTEMI (non-ST elevated myocardial infarction)  Goal:	Maintain optimal cardiac health  Assessment and plan of treatment:	Restricted use with no heavy lifting of affected arm for 48 hours.  No submerging the arm in water for 48 hours.  You may start showering today.  Call your doctor for any bleeding, swelling, loss of sensation in the hand or fingers, or fingers turning blue.  If heavy bleeding or large lumps form, hold pressure at the spot and come to the Emergency Room.  DO NOT STOP ASPRIN OR PLAVIX UNLESS INSTRUCTED BY CARDIOLOGIST

## 2019-03-04 NOTE — DISCHARGE NOTE ADULT - CARE PROVIDER_API CALL
Catalino Brenner)  Cardiology  [address] [place] [address]  Phone: [phone]  Fax: [phone]  Follow Up Time:

## 2019-03-04 NOTE — H&P PST ADULT - PMH
BPH (benign prostatic hyperplasia)    Essential hypertension    Gout    Hyperlipidemia    Lymphoma in remission  Tonsillar large cell lymphoma in remission since 2015. Treatment @ [facility]  Subdural hematoma

## 2019-03-04 NOTE — DISCHARGE NOTE ADULT - HOSPITAL COURSE
HPI:  This is an 82 yo white male transferred from [facility] [place] s/p NSTEMI for PCI to the RCA.  Mr Derik went to [facility] [place] from his home for c/o CP.  PMHx is significant for Cardiac catheterization 20 years ago, or more, which was negative for CAD.  He does have a history of controlled HTN and HLD, gout, R hip replacement and lymphoma for which he had been treated at [facility] 5 years ago.  He was to go to [place] tomorrow for his final visit.  Patient was treated for a subdural hematoma at [facility] approximately 3 weeks ago where he had the hematoma drained.  He currently is neurologically intact.  Troponin T 1.00; 0.685; 0.313; 0.214. . Patient loaded with Plavix 600 mg on 3/3/2019 and has been on 75 mg daily since. Dose given this am in addition to ASA 81 mg at [facility] [place].    Mallampati III  ASA 2  GFR: > 60  Creat: 0.9  Bleeding Risk:  1.3% (04 Mar 2019 12:39)    now s/p LHC with succesful PCI and OG to the mid and distal RCA    ASSESSMENT/PLAN:    Coronary artery disease  -Encourage PO fluids  -Aspirin 81 mg PO daily  -Plavix 75mg PO daily  -Metoprolol 25 mg PO daily  -No ACE secondary to risk for Relative hyoptension  -atorvastatin 80mg PO QHS   -Plan of care discussed with patient, family and MD  -Follow-up with attending Dr Brenner within 1 week  -Discussed therapeutic lifestyle changes to reduce risk factors such as following a cardiac diet, weight loss, maintaining a healthy weight, exercise, smoking cessation, medication compliance, and regular follow-up  with MD to know your numbers (BP, cholesterol, weight, and glucose) HPI:  This is an 82 yo white male transferred from [facility] s/p NSTEMI for PCI to the RCA.  Mr eDrik went to [facility] from his home for c/o CP.  PMHx is significant for Cardiac catheterization 20 years ago, or more, which was negative for CAD.  He does have a history of controlled HTN and HLD, gout, R hip replacement and lymphoma for which he had been treated at [facility] 5 years ago.  He was to go to [place] tomorrow for his final visit.  Patient was treated for a subdural hematoma at [facility] approximately 3 weeks ago where he had the hematoma drained.  He currently is neurologically intact.  Troponin T 1.00; 0.685; 0.313; 0.214. . Patient loaded with Plavix 600 mg on 3/3/2019 and has been on 75 mg daily since. Dose given this am in addition to ASA 81 mg at [facility].    Mallampati III  ASA 2  GFR: > 60  Creat: 0.9  Bleeding Risk:  1.3% (04 Mar 2019 12:39)    now s/p LHC with succesful PCI and OG to the mid and distal RCA  overnight mild confusion   am back to neurological baseline with mild forgetfulness reviewed with 3 family members'    ASSESSMENT/PLAN:    Coronary artery disease  -Encourage PO fluids  -Aspirin 81 mg PO daily  -Plavix 75mg PO daily  -Metoprolol 25 mg PO daily  -No ACE secondary to risk for Relative hyoptension  -atorvastatin 80mg PO QHS   -Plan of care discussed with patient, family and MD  -Follow-up with attending Dr Brenner within 1 week  Follow up with Neurologist   -Discussed therapeutic lifestyle changes to reduce risk factors such as following a cardiac diet, weight loss, maintaining a healthy weight, exercise, smoking cessation, medication compliance, and regular follow-up  with MD to know your numbers (BP, cholesterol, weight, and glucose)

## 2019-03-04 NOTE — H&P PST ADULT - NEGATIVE CARDIOVASCULAR SYMPTOMS
no palpitations/no dyspnea on exertion/no paroxysmal nocturnal dyspnea/no claudication/no peripheral edema/no orthopnea

## 2019-03-04 NOTE — DISCHARGE NOTE ADULT - PLAN OF CARE
Maintain optimal cardiac health Restricted use with no heavy lifting of affected arm for 48 hours.  No submerging the arm in water for 48 hours.  You may start showering today.  Call your doctor for any bleeding, swelling, loss of sensation in the hand or fingers, or fingers turning blue.  If heavy bleeding or large lumps form, hold pressure at the spot and come to the Emergency Room. Restricted use with no heavy lifting of affected arm for 48 hours.  No submerging the arm in water for 48 hours.  You may start showering today.  Call your doctor for any bleeding, swelling, loss of sensation in the hand or fingers, or fingers turning blue.  If heavy bleeding or large lumps form, hold pressure at the spot and come to the Emergency Room.  DO NOT STOP ASPRIN OR PLAVIX UNLESS INSTRUCTED BY CARDIOLOGIST

## 2019-03-04 NOTE — H&P PST ADULT - HISTORY OF PRESENT ILLNESS
This is an 84 yo white male transferred from [facility] [place] [place] for PCI to the RCA.  Mr Derik went to [facility] [place] [place] from his home for c/o CP.  PMHx is significant for Cardiac catheterization 20 years ago, or more, which was negative for CAD.  He does have a history of controlled HTN and HLD, gout, R hip replacement and lymphoma for which he had been treated at [facility] 5 years ago.  He was to go to [place] tomorrow for his final visit.  Patient was treated for a subdural hematoma at [facility] approximately 3 weeks ago where he had the hematoma drained.  He currently is neurologically intact.  Troponin T 1.00; 0.685; 0.313; 0.214. . Patient loaded with Plavix 600 mg on 3/3/2019 and has been on 75 mg daily since. Dose given this am in addition to ASA 81 mg at [facility] [place] [place].    Mallampati III  ASA 2  GFR: > 60  Creat: 0.9  Bleeding Risk:  1.3%

## 2019-03-04 NOTE — DISCHARGE NOTE ADULT - NS AS ACTIVITY OBS
Showering allowed/Walking-Indoors allowed/Do not make important decisions/Do not drive or operate machinery/No Heavy lifting/straining

## 2019-03-04 NOTE — DISCHARGE NOTE ADULT - NS AS DC AMI ACE CONTRA
Relative risk of hypotension secondary to age and multipharmacy/other reasons documented by Physician / Nurse Practitioner / Physician Assistant  or Pharmacist for not prescribing an ACEI AND not prescibing an ARB at discharge

## 2019-03-04 NOTE — PROGRESS NOTE ADULT - SUBJECTIVE AND OBJECTIVE BOX
Nurse Practitioner Progress note:   s/p C with successful PCI and OG to      Patient feels well.  Denies chest pain, shortness of breath, dizziness or palpitations at this time    Patient A&O x 3  Lungs CTA  S1S2 no MRG  Right radial hemoband in place.  Procedure site CDI, no bleeding, no hematoma, able to move all digits with capillary refill < 3 seconds, fingers warm      T(C): 36.6 (03-04-19 @ 12:39), Max: 36.6 (03-04-19 @ 12:39)  HR: 92 (03-04-19 @ 12:39) (92 - 92)  BP: 155/77 (03-04-19 @ 12:39) (155/77 - 155/77)  RR: 30 (03-04-19 @ 12:39) (30 - 30)  SpO2: 95% (03-04-19 @ 12:39) (95% - 95%)        Post procedure EKG:  Sinus Rhythm with LAD, LBBB and ols IWMI        MEDICATIONS  (STANDING):  allopurinol 300 milliGRAM(s) Oral daily  amLODIPine   Tablet 5 milliGRAM(s) Oral daily  aspirin  chewable 81 milliGRAM(s) Oral daily  atorvastatin 80 milliGRAM(s) Oral at bedtime  clopidogrel Tablet 75 milliGRAM(s) Oral daily  levETIRAcetam 500 milliGRAM(s) Oral two times a day  metoprolol tartrate 25 milliGRAM(s) Oral two times a day  tamsulosin 0.4 milliGRAM(s) Oral at bedtime    MEDICATIONS  (PRN):  acetaminophen   Tablet .. 650 milliGRAM(s) Oral every 6 hours PRN Mild Pain (1 - 3)        HPI:  This is an 84 yo white male [place] [facility] [place] to the RCA.  Mr More went to [facility] [place] [facility] from his home for c/o CP.  PMHx is significant for Cardiac catheterization 20 years ago, or more, which was negative for CAD.  He does have a history of controlled HTN and HLD, gout, R hip replacement and lymphoma for which he had been treated at [facility] 5 years ago.  He was to go to [place] tomorrow for his final visit.  Patient was treated for a subdural hematoma at [facility] approximately 3 weeks ago where he had the hematoma drained.  He currently is neurologically intact.  Troponin T 1.00; 0.685; 0.313; 0.214. . Patient loaded with Plavix 600 mg on 3/3/2019 and has been on 75 mg daily since. Dose given this am in addition to ASA 81 mg at [facility] [place].    Mallampati III  ASA 2  GFR: > 60  Creat: 0.9  Bleeding Risk:  1.3% (04 Mar 2019 12:39)    now s/p [facility] with succesful PCI and OG to the mid and distal RCA    ASSESSMENT/PLAN:    Coronary artery disease  -Admit to telemetry  -VS, labs, diet, activity as per PCI orders  -IV hydration  -Encourage PO fluids  -Aspirin 81 mg PO daily  -Plavix 75mg PO daily  -Metoprolol 25 mg PO daily  -No ACE secondary to risk for Relative hyoptension  -atorvastatin 80mg PO QHS   -Plan of care discussed with patient, family and MD  -likely d/c in AM if patient remains stable overnight  -NP to see in AM to evaluate labs, EKG and procedure site check  -Follow-up with attending Dr Brenner within 1 week  -Discussed therapeutic lifestyle changes to reduce risk factors such as following a cardiac diet, weight loss, maintaining a healthy weight, exercise, smoking cessation, medication compliance, and regular follow-up  with MD to know your numbers (BP, cholesterol, weight, and glucose)

## 2019-03-05 VITALS
HEART RATE: 72 BPM | SYSTOLIC BLOOD PRESSURE: 118 MMHG | TEMPERATURE: 98 F | DIASTOLIC BLOOD PRESSURE: 66 MMHG | RESPIRATION RATE: 16 BRPM | OXYGEN SATURATION: 97 %

## 2019-03-05 DIAGNOSIS — I21.4 NON-ST ELEVATION (NSTEMI) MYOCARDIAL INFARCTION: ICD-10-CM

## 2019-03-05 LAB
ANION GAP SERPL CALC-SCNC: 12 MMOL/L — SIGNIFICANT CHANGE UP (ref 5–17)
APTT BLD: 29.5 SEC — SIGNIFICANT CHANGE UP (ref 27.5–36.3)
BASOPHILS # BLD AUTO: 0 K/UL — SIGNIFICANT CHANGE UP (ref 0–0.2)
BASOPHILS NFR BLD AUTO: 0.1 % — SIGNIFICANT CHANGE UP (ref 0–2)
BUN SERPL-MCNC: 14 MG/DL — SIGNIFICANT CHANGE UP (ref 8–20)
CALCIUM SERPL-MCNC: 9.2 MG/DL — SIGNIFICANT CHANGE UP (ref 8.6–10.2)
CHLORIDE SERPL-SCNC: 104 MMOL/L — SIGNIFICANT CHANGE UP (ref 98–107)
CO2 SERPL-SCNC: 24 MMOL/L — SIGNIFICANT CHANGE UP (ref 22–29)
CREAT SERPL-MCNC: 1.01 MG/DL — SIGNIFICANT CHANGE UP (ref 0.5–1.3)
EOSINOPHIL # BLD AUTO: 0 K/UL — SIGNIFICANT CHANGE UP (ref 0–0.5)
EOSINOPHIL NFR BLD AUTO: 0.4 % — SIGNIFICANT CHANGE UP (ref 0–5)
GLUCOSE SERPL-MCNC: 106 MG/DL — SIGNIFICANT CHANGE UP (ref 70–115)
HCT VFR BLD CALC: 40.2 % — LOW (ref 42–52)
HGB BLD-MCNC: 13.8 G/DL — LOW (ref 14–18)
INR BLD: 0.96 RATIO — SIGNIFICANT CHANGE UP (ref 0.88–1.16)
LYMPHOCYTES # BLD AUTO: 1.4 K/UL — SIGNIFICANT CHANGE UP (ref 1–4.8)
LYMPHOCYTES # BLD AUTO: 17.1 % — LOW (ref 20–55)
MCHC RBC-ENTMCNC: 31.4 PG — HIGH (ref 27–31)
MCHC RBC-ENTMCNC: 34.3 G/DL — SIGNIFICANT CHANGE UP (ref 32–36)
MCV RBC AUTO: 91.4 FL — SIGNIFICANT CHANGE UP (ref 80–94)
MONOCYTES # BLD AUTO: 0.8 K/UL — SIGNIFICANT CHANGE UP (ref 0–0.8)
MONOCYTES NFR BLD AUTO: 9.6 % — SIGNIFICANT CHANGE UP (ref 3–10)
NEUTROPHILS # BLD AUTO: 6 K/UL — SIGNIFICANT CHANGE UP (ref 1.8–8)
NEUTROPHILS NFR BLD AUTO: 72.7 % — SIGNIFICANT CHANGE UP (ref 37–73)
PLATELET # BLD AUTO: 202 K/UL — SIGNIFICANT CHANGE UP (ref 150–400)
POTASSIUM SERPL-MCNC: 3.7 MMOL/L — SIGNIFICANT CHANGE UP (ref 3.5–5.3)
POTASSIUM SERPL-SCNC: 3.7 MMOL/L — SIGNIFICANT CHANGE UP (ref 3.5–5.3)
PROTHROM AB SERPL-ACNC: 11 SEC — SIGNIFICANT CHANGE UP (ref 10–12.9)
RBC # BLD: 4.4 M/UL — LOW (ref 4.6–6.2)
RBC # FLD: 13.2 % — SIGNIFICANT CHANGE UP (ref 11–15.6)
SODIUM SERPL-SCNC: 140 MMOL/L — SIGNIFICANT CHANGE UP (ref 135–145)
WBC # BLD: 8.2 K/UL — SIGNIFICANT CHANGE UP (ref 4.8–10.8)
WBC # FLD AUTO: 8.2 K/UL — SIGNIFICANT CHANGE UP (ref 4.8–10.8)

## 2019-03-05 PROCEDURE — C1894: CPT

## 2019-03-05 PROCEDURE — C1757: CPT

## 2019-03-05 PROCEDURE — 93005 ELECTROCARDIOGRAM TRACING: CPT

## 2019-03-05 PROCEDURE — 86901 BLOOD TYPING SEROLOGIC RH(D): CPT

## 2019-03-05 PROCEDURE — C1887: CPT

## 2019-03-05 PROCEDURE — 86850 RBC ANTIBODY SCREEN: CPT

## 2019-03-05 PROCEDURE — C1769: CPT

## 2019-03-05 PROCEDURE — 99152 MOD SED SAME PHYS/QHP 5/>YRS: CPT

## 2019-03-05 PROCEDURE — 85027 COMPLETE CBC AUTOMATED: CPT

## 2019-03-05 PROCEDURE — C9606: CPT | Mod: RC

## 2019-03-05 PROCEDURE — 85610 PROTHROMBIN TIME: CPT

## 2019-03-05 PROCEDURE — 93010 ELECTROCARDIOGRAM REPORT: CPT

## 2019-03-05 PROCEDURE — 86900 BLOOD TYPING SEROLOGIC ABO: CPT

## 2019-03-05 PROCEDURE — 80048 BASIC METABOLIC PNL TOTAL CA: CPT

## 2019-03-05 PROCEDURE — 85730 THROMBOPLASTIN TIME PARTIAL: CPT

## 2019-03-05 PROCEDURE — C1874: CPT

## 2019-03-05 PROCEDURE — C1725: CPT

## 2019-03-05 PROCEDURE — 36415 COLL VENOUS BLD VENIPUNCTURE: CPT

## 2019-03-05 PROCEDURE — 92921: CPT | Mod: RC

## 2019-03-05 PROCEDURE — 99153 MOD SED SAME PHYS/QHP EA: CPT

## 2019-03-05 RX ORDER — CLOPIDOGREL BISULFATE 75 MG/1
1 TABLET, FILM COATED ORAL
Qty: 90 | Refills: 3 | OUTPATIENT
Start: 2019-03-05 | End: 2020-02-27

## 2019-03-05 RX ORDER — ASPIRIN/CALCIUM CARB/MAGNESIUM 324 MG
1 TABLET ORAL
Qty: 0 | Refills: 0 | COMMUNITY
Start: 2019-03-05

## 2019-03-05 RX ORDER — METOPROLOL TARTRATE 50 MG
1 TABLET ORAL
Qty: 60 | Refills: 1 | OUTPATIENT
Start: 2019-03-05 | End: 2019-05-03

## 2019-03-05 RX ORDER — METOPROLOL TARTRATE 50 MG
1 TABLET ORAL
Qty: 0 | Refills: 0 | COMMUNITY
Start: 2019-03-05

## 2019-03-05 RX ADMIN — LEVETIRACETAM 500 MILLIGRAM(S): 250 TABLET, FILM COATED ORAL at 08:15

## 2019-03-05 RX ADMIN — CLOPIDOGREL BISULFATE 75 MILLIGRAM(S): 75 TABLET, FILM COATED ORAL at 10:26

## 2019-03-05 RX ADMIN — Medication 81 MILLIGRAM(S): at 10:25

## 2019-03-05 RX ADMIN — Medication 25 MILLIGRAM(S): at 08:15

## 2019-03-05 RX ADMIN — AMLODIPINE BESYLATE 5 MILLIGRAM(S): 2.5 TABLET ORAL at 08:14

## 2019-03-05 NOTE — PROGRESS NOTE ADULT - ASSESSMENT
This is an 84 yo white male transferred from [facility] for PCI to the RCA.  Mr Derik went to [facility] from his home for c/o CP.  PMHx is significant for Cardiac catheterization 20 years ago, or more, which was negative for CAD.   HTN and HLD, gout, R hip replacement and lymphoma for which he had been treated at [facility] 5 years ago.  He was to go to [place] tomorrow for his final visit.  Patient was treated for a subdural hematoma at [facility] approximately 3 weeks ago where he had the hematoma drained.  He currently is neurologically intact.  Troponin T 1.00; 0.685; 0.313; 0.214. . Patient loaded with Plavix 600 mg on 3/3/2019 and has been on 75 mg daily since. Dose given this am in addition to ASA 81 mg at [facility].  Pt presented to [facility] for cardiac cath and intervention   s/p cath with successful  PCI and OG to the mid and distal RCA  Overnight periods of confusion  this am reoriented to situation   Reviewed with wife and 2 sons forgetfulness . They  all report some episodes of forgetfulness prior to subdural hematoma. They feel he is at his baseline neurological status with good recall of current and distant  events ambulating  no other deficits noted

## 2019-03-05 NOTE — PROGRESS NOTE ADULT - SUBJECTIVE AND OBJECTIVE BOX
Subjective:  Pt s/p LHC   Overnight some periods of confusion   reoriented to this am   Reports no chest pain or SOB at present     Medications:  acetaminophen   Tablet .. 650 milliGRAM(s) Oral every 6 hours PRN  allopurinol 300 milliGRAM(s) Oral daily  amLODIPine   Tablet 5 milliGRAM(s) Oral daily  aspirin  chewable 81 milliGRAM(s) Oral daily  atorvastatin 80 milliGRAM(s) Oral at bedtime  clopidogrel Tablet 75 milliGRAM(s) Oral daily  levETIRAcetam 500 milliGRAM(s) Oral two times a day  metoprolol tartrate 25 milliGRAM(s) Oral two times a day  tamsulosin 0.4 milliGRAM(s) Oral at bedtime      PHYSICAL EXAM:  Vital Signs Last 24 Hrs  T(C): 36.6 (04 Mar 2019 12:39), Max: 36.6 (04 Mar 2019 12:39)  T(F): 97.9 (04 Mar 2019 12:39), Max: 97.9 (04 Mar 2019 12:39)  HR: 73 (05 Mar 2019 08:01) (50 - 92)  BP: 122/63 (05 Mar 2019 08:01) (102/61 - 155/77)  RR: 15 (05 Mar 2019 08:01) (14 - 20)  SpO2: 97% (05 Mar 2019 08:01) (92% - 98%)  Daily Height in cm: 167.64 (04 Mar 2019 12:39)    04 Mar 2019 07:01  -  05 Mar 2019 07:00  --------------------------------------------------------  IN:    Oral Fluid: 360 mL  Total IN: 360 mL    OUT:    Intermittent Catheterization - Urethral: 550 mL    Voided: 450 mL  Total OUT: 1000 mL    Total NET: -640 mL          General: A/ox 2 ,  walking in unit No acute Distress WARREN eating   Head    Neck: Supple, NO JVD  Cardiac: S1 S2, No M/R/G  Pulmonary: CTAB, Breathing unlabored, No Rhonchi/Rales/Wheezing  Abdomen: Soft, Non -tender, +BS   Extremities: No Rashes, No edema  R radial cath site  good capillary refill fingers warm,  and mobile   Neuro: A/o No focal deficits  Psch: normal mood , normal affect    LABS:                          13.8   8.2   )-----------( 202      ( 05 Mar 2019 05:34 )             40.2     03-05    140  |  104  |  14.0  ----------------------------<  106  3.7   |  24.0  |  1.01    Ca    9.2      05 Mar 2019 05:34      PT/INR - ( 05 Mar 2019 05:34 )   PT: 11.0 sec;   INR: 0.96 ratio         PTT - ( 05 Mar 2019 05:34 )  PTT:29.5 sec Subjective:  Pt s/p LHC   Overnight some periods of confusion reoriented to this am   Reports no chest pain or SOB at present     Medications:  acetaminophen   Tablet .. 650 milliGRAM(s) Oral every 6 hours PRN  allopurinol 300 milliGRAM(s) Oral daily  amLODIPine   Tablet 5 milliGRAM(s) Oral daily  aspirin  chewable 81 milliGRAM(s) Oral daily  atorvastatin 80 milliGRAM(s) Oral at bedtime  clopidogrel Tablet 75 milliGRAM(s) Oral daily  levETIRAcetam 500 milliGRAM(s) Oral two times a day  metoprolol tartrate 25 milliGRAM(s) Oral two times a day  tamsulosin 0.4 milliGRAM(s) Oral at bedtime      PHYSICAL EXAM:  Vital Signs Last 24 Hrs  T(C): 36.6 (04 Mar 2019 12:39), Max: 36.6 (04 Mar 2019 12:39)  T(F): 97.9 (04 Mar 2019 12:39), Max: 97.9 (04 Mar 2019 12:39)  HR: 73 (05 Mar 2019 08:01) (50 - 92)  BP: 122/63 (05 Mar 2019 08:01) (102/61 - 155/77)  RR: 15 (05 Mar 2019 08:01) (14 - 20)  SpO2: 97% (05 Mar 2019 08:01) (92% - 98%)  Daily Height in cm: 167.64 (04 Mar 2019 12:39)    04 Mar 2019 07:01  -  05 Mar 2019 07:00  --------------------------------------------------------  IN:    Oral Fluid: 360 mL  Total IN: 360 mL    OUT:    Intermittent Catheterization - Urethral: 550 mL    Voided: 450 mL  Total OUT: 1000 mL    Total NET: -640 mL          General: A/ox 2 ,  reoriented to surroundings   walking in unit No acute Distress WARREN eating   Head  laceration with dsg from previous fall   Neck: Supple, NO JVD  Cardiac: S1 S2, No M/R/G  Pulmonary: CTAB, Breathing unlabored, No Rhonchi/Rales/Wheezing  Abdomen: Soft, Non -tender, +BS   Extremities: No Rashes, No edema  R radial cath site  good capillary refill fingers warm,  and mobile no evidence of bleeding   Neuro: A/o No focal deficits ambulating with family in unit   [facility]: normal mood , normal affect    LABS:                          13.8   8.2   )-----------( 202      ( 05 Mar 2019 05:34 )             40.2     03-05    140  |  104  |  14.0  ----------------------------<  106  3.7   |  24.0  |  1.01    Ca    9.2      05 Mar 2019 05:34      PT/INR - ( 05 Mar 2019 05:34 )   PT: 11.0 sec;   INR: 0.96 ratio         PTT - ( 05 Mar 2019 05:34 )  PTT:29.5 sec    EKG 3/5 NSR  with first degree AV block with baseline LBBB rate 65 ( compared to 3/4 no changes )

## 2019-03-05 NOTE — PROGRESS NOTE ADULT - PROBLEM SELECTOR PLAN 1
Post procedure reviewed with patient and 3 family members   ASA and Plavix daily reinforced and to be discontinued only by cardiologist   Wrist precautions reviewed   Continue current medications   Pt to be discharged today   pt to follow up with Cardiologist Dr Dyson   Pt to follow up with Neurologist

## 2019-03-05 NOTE — PROGRESS NOTE ADULT - SUBJECTIVE AND OBJECTIVE BOX
Subjective:    Medications:  acetaminophen   Tablet .. 650 milliGRAM(s) Oral every 6 hours PRN  allopurinol 300 milliGRAM(s) Oral daily  amLODIPine   Tablet 5 milliGRAM(s) Oral daily  aspirin  chewable 81 milliGRAM(s) Oral daily  atorvastatin 80 milliGRAM(s) Oral at bedtime  clopidogrel Tablet 75 milliGRAM(s) Oral daily  levETIRAcetam 500 milliGRAM(s) Oral two times a day  metoprolol tartrate 25 milliGRAM(s) Oral two times a day  tamsulosin 0.4 milliGRAM(s) Oral at bedtime      PHYSICAL EXAM:  Vital Signs Last 24 Hrs  T(C): 36.6 (04 Mar 2019 12:39), Max: 36.6 (04 Mar 2019 12:39)  T(F): 97.9 (04 Mar 2019 12:39), Max: 97.9 (04 Mar 2019 12:39)  HR: 73 (05 Mar 2019 08:01) (50 - 92)  BP: 122/63 (05 Mar 2019 08:01) (102/61 - 155/77)  BP(mean): --  RR: 15 (05 Mar 2019 08:01) (14 - 20)  SpO2: 97% (05 Mar 2019 08:01) (92% - 98%)  Daily Height in cm: 167.64 (04 Mar 2019 12:39)    Daily   I&O's Detail    04 Mar 2019 07:01  -  05 Mar 2019 07:00  --------------------------------------------------------  IN:    Oral Fluid: 360 mL  Total IN: 360 mL    OUT:    Intermittent Catheterization - Urethral: 550 mL    Voided: 450 mL  Total OUT: 1000 mL    Total NET: -640 mL          General: A/ox 3, No acute Distress  Neck: Supple, NO JVD  Cardiac: S1 S2, No M/R/G  Pulmonary: CTAB, Breathing unlabored, No Rhonchi/Rales/Wheezing  Abdomen: Soft, Non -tender, +BS   Extremities: No Rashes, No edema  R radial cath site fingers warm and mobile   Neuro: A/o x 3, No focal deficits  Psch: normal mood , normal affect    LABS:                          13.8   8.2   )-----------( 202      ( 05 Mar 2019 05:34 )             40.2     03-05    140  |  104  |  14.0  ----------------------------<  106  3.7   |  24.0  |  1.01    Ca    9.2      05 Mar 2019 05:34      PT/INR - ( 05 Mar 2019 05:34 )   PT: 11.0 sec;   INR: 0.96 ratio         PTT - ( 05 Mar 2019 05:34 )  PTT:29.5 sec

## 2019-03-11 ENCOUNTER — APPOINTMENT (OUTPATIENT)
Dept: CARDIOLOGY | Facility: CLINIC | Age: 84
End: 2019-03-11
Payer: MEDICARE

## 2019-03-11 ENCOUNTER — NON-APPOINTMENT (OUTPATIENT)
Age: 84
End: 2019-03-11

## 2019-03-11 VITALS
WEIGHT: 164 LBS | OXYGEN SATURATION: 95 % | DIASTOLIC BLOOD PRESSURE: 60 MMHG | HEIGHT: 66 IN | SYSTOLIC BLOOD PRESSURE: 100 MMHG | HEART RATE: 60 BPM | BODY MASS INDEX: 26.36 KG/M2

## 2019-03-11 PROCEDURE — 99214 OFFICE O/P EST MOD 30 MIN: CPT

## 2019-03-11 PROCEDURE — 93000 ELECTROCARDIOGRAM COMPLETE: CPT

## 2019-03-11 RX ORDER — ASPIRIN 81 MG
81 TABLET, DELAYED RELEASE (ENTERIC COATED) ORAL DAILY
Refills: 0 | Status: ACTIVE | COMMUNITY

## 2019-03-18 ENCOUNTER — EMERGENCY (EMERGENCY)
Facility: HOSPITAL | Age: 84
LOS: 1 days | End: 2019-03-18
Admitting: EMERGENCY MEDICINE
Payer: MEDICARE

## 2019-03-18 DIAGNOSIS — Z96.649 PRESENCE OF UNSPECIFIED ARTIFICIAL HIP JOINT: Chronic | ICD-10-CM

## 2019-03-18 PROCEDURE — 99283 EMERGENCY DEPT VISIT LOW MDM: CPT

## 2019-03-20 ENCOUNTER — APPOINTMENT (OUTPATIENT)
Dept: DERMATOLOGY | Facility: CLINIC | Age: 84
End: 2019-03-20
Payer: MEDICARE

## 2019-03-20 PROCEDURE — 99214 OFFICE O/P EST MOD 30 MIN: CPT

## 2019-03-28 ENCOUNTER — APPOINTMENT (OUTPATIENT)
Dept: CARDIOLOGY | Facility: CLINIC | Age: 84
End: 2019-03-28
Payer: MEDICARE

## 2019-03-28 VITALS
DIASTOLIC BLOOD PRESSURE: 68 MMHG | OXYGEN SATURATION: 98 % | HEIGHT: 66 IN | HEART RATE: 61 BPM | BODY MASS INDEX: 27 KG/M2 | SYSTOLIC BLOOD PRESSURE: 112 MMHG | WEIGHT: 168 LBS

## 2019-03-28 PROCEDURE — 99214 OFFICE O/P EST MOD 30 MIN: CPT

## 2019-04-02 ENCOUNTER — RECORD ABSTRACTING (OUTPATIENT)
Age: 84
End: 2019-04-02

## 2019-04-02 ENCOUNTER — APPOINTMENT (OUTPATIENT)
Dept: DERMATOLOGY | Facility: CLINIC | Age: 84
End: 2019-04-02
Payer: MEDICARE

## 2019-04-02 ENCOUNTER — EMERGENCY (EMERGENCY)
Facility: HOSPITAL | Age: 84
LOS: 1 days | End: 2019-04-02
Admitting: EMERGENCY MEDICINE
Payer: MEDICARE

## 2019-04-02 DIAGNOSIS — Z96.649 PRESENCE OF UNSPECIFIED ARTIFICIAL HIP JOINT: Chronic | ICD-10-CM

## 2019-04-02 PROCEDURE — 99282 EMERGENCY DEPT VISIT SF MDM: CPT

## 2019-04-02 PROCEDURE — 17311 MOHS 1 STAGE H/N/HF/G: CPT

## 2019-04-02 PROCEDURE — 15260 FTH/GFT FR N/E/E/L 20 SQCM/<: CPT

## 2019-04-03 ENCOUNTER — EMERGENCY (EMERGENCY)
Facility: HOSPITAL | Age: 84
LOS: 1 days | End: 2019-04-03
Payer: MEDICARE

## 2019-04-03 DIAGNOSIS — Z96.649 PRESENCE OF UNSPECIFIED ARTIFICIAL HIP JOINT: Chronic | ICD-10-CM

## 2019-04-03 PROCEDURE — 70450 CT HEAD/BRAIN W/O DYE: CPT | Mod: 26

## 2019-04-03 PROCEDURE — 99285 EMERGENCY DEPT VISIT HI MDM: CPT

## 2019-04-03 PROCEDURE — 71046 X-RAY EXAM CHEST 2 VIEWS: CPT | Mod: 26

## 2019-04-04 ENCOUNTER — APPOINTMENT (OUTPATIENT)
Dept: DERMATOLOGY | Facility: CLINIC | Age: 84
End: 2019-04-04
Payer: MEDICARE

## 2019-04-04 ENCOUNTER — APPOINTMENT (OUTPATIENT)
Dept: DERMATOLOGY | Facility: CLINIC | Age: 84
End: 2019-04-04

## 2019-04-04 PROCEDURE — 99024 POSTOP FOLLOW-UP VISIT: CPT

## 2019-04-09 ENCOUNTER — APPOINTMENT (OUTPATIENT)
Dept: DERMATOLOGY | Facility: CLINIC | Age: 84
End: 2019-04-09
Payer: MEDICARE

## 2019-04-09 DIAGNOSIS — C44.310 BASAL CELL CARCINOMA OF SKIN OF UNSPECIFIED PARTS OF FACE: ICD-10-CM

## 2019-04-09 PROCEDURE — 99024 POSTOP FOLLOW-UP VISIT: CPT

## 2019-04-15 ENCOUNTER — APPOINTMENT (OUTPATIENT)
Dept: MRI IMAGING | Facility: CLINIC | Age: 84
End: 2019-04-15
Payer: MEDICARE

## 2019-04-15 PROCEDURE — 70551 MRI BRAIN STEM W/O DYE: CPT

## 2019-05-02 ENCOUNTER — APPOINTMENT (OUTPATIENT)
Dept: CARDIOLOGY | Facility: CLINIC | Age: 84
End: 2019-05-02
Payer: MEDICARE

## 2019-05-02 VITALS
DIASTOLIC BLOOD PRESSURE: 60 MMHG | HEIGHT: 66 IN | SYSTOLIC BLOOD PRESSURE: 118 MMHG | BODY MASS INDEX: 26.36 KG/M2 | HEART RATE: 66 BPM | WEIGHT: 164 LBS

## 2019-05-02 PROCEDURE — 99214 OFFICE O/P EST MOD 30 MIN: CPT

## 2019-05-02 RX ORDER — HYDROCODONE BITARTRATE AND ACETAMINOPHEN 5; 325 MG/1; MG/1
5-325 TABLET ORAL
Qty: 5 | Refills: 0 | Status: DISCONTINUED | COMMUNITY
Start: 2019-04-02 | End: 2019-05-02

## 2019-05-14 ENCOUNTER — MEDICATION RENEWAL (OUTPATIENT)
Age: 84
End: 2019-05-14

## 2019-05-23 ENCOUNTER — APPOINTMENT (OUTPATIENT)
Dept: DERMATOLOGY | Facility: CLINIC | Age: 84
End: 2019-05-23

## 2019-08-23 ENCOUNTER — NON-APPOINTMENT (OUTPATIENT)
Age: 84
End: 2019-08-23

## 2019-08-23 ENCOUNTER — APPOINTMENT (OUTPATIENT)
Dept: CARDIOLOGY | Facility: CLINIC | Age: 84
End: 2019-08-23
Payer: MEDICARE

## 2019-08-23 VITALS
HEART RATE: 45 BPM | DIASTOLIC BLOOD PRESSURE: 62 MMHG | SYSTOLIC BLOOD PRESSURE: 122 MMHG | WEIGHT: 167 LBS | BODY MASS INDEX: 26.84 KG/M2 | OXYGEN SATURATION: 94 % | HEIGHT: 66 IN

## 2019-08-23 PROCEDURE — 93000 ELECTROCARDIOGRAM COMPLETE: CPT

## 2019-08-23 PROCEDURE — 99214 OFFICE O/P EST MOD 30 MIN: CPT | Mod: 25

## 2019-08-23 RX ORDER — CEPHALEXIN 500 MG/1
500 CAPSULE ORAL
Qty: 14 | Refills: 0 | Status: DISCONTINUED | COMMUNITY
Start: 2019-04-02 | End: 2019-08-23

## 2019-08-23 RX ORDER — METOPROLOL TARTRATE 25 MG/1
25 TABLET, FILM COATED ORAL
Qty: 90 | Refills: 3 | Status: DISCONTINUED | COMMUNITY
End: 2019-08-23

## 2019-08-23 NOTE — HISTORY OF PRESENT ILLNESS
[FreeTextEntry1] : 83M w/ PMH of HTN, HLD, BPH, basal cell carcinoma of the nose presents after recent NSTEMI and PCI to his RCA.  Presented to [facility] after an episode of exertional angina.  Ruled in for MI.  Diagnostic angiography revealed a severe RCA lesion and the patient was transferred to [facility] for high-risk PCI given location of the lesion.  He returns today without chest pain, SOB, LE edema or palpitations.  His wrist site is healing well and he is overall asymptomatic.  Reports compliance with medications. \par \par Feeling well since his last visit. Denies chest pains.  Compliant with DAPT. no bleeding or excessive bruising issues.

## 2019-08-23 NOTE — REASON FOR VISIT
[Follow-Up - Clinic] : a clinic follow-up of [Coronary Artery Disease] : coronary artery disease [Hyperlipidemia] : hyperlipidemia [Hypertension] : hypertension [Medication Management] : Medication management [Spouse] : spouse [Prior Myocardial Infarction] : a prior myocardial infarction [Family Member] : family member

## 2019-08-23 NOTE — PHYSICAL EXAM
[Normal Appearance] : normal appearance [General Appearance - Well Developed] : well developed [General Appearance - Well Nourished] : well nourished [Well Groomed] : well groomed [General Appearance - In No Acute Distress] : no acute distress [No Deformities] : no deformities [Normal Conjunctiva] : the conjunctiva exhibited no abnormalities [Eyelids - No Xanthelasma] : the eyelids demonstrated no xanthelasmas [Normal Oral Mucosa] : normal oral mucosa [No Oral Cyanosis] : no oral cyanosis [No Oral Pallor] : no oral pallor [Respiration, Rhythm And Depth] : normal respiratory rhythm and effort [Exaggerated Use Of Accessory Muscles For Inspiration] : no accessory muscle use [Auscultation Breath Sounds / Voice Sounds] : lungs were clear to auscultation bilaterally [Edema] : no peripheral edema present [Heart Sounds] : normal S1 and S2 [Murmurs] : no murmurs present [Abdomen Mass (___ Cm)] : no abdominal mass palpated [Abnormal Walk] : normal gait [Gait - Sufficient For Exercise Testing] : the gait was sufficient for exercise testing [Cyanosis, Localized] : no localized cyanosis [Nail Clubbing] : no clubbing of the fingernails [Petechial Hemorrhages (___cm)] : no petechial hemorrhages [No Venous Stasis] : no venous stasis [] : no rash [Skin Color & Pigmentation] : normal skin color and pigmentation [No Xanthoma] : no  xanthoma was observed [Affect] : the affect was normal [Impaired Insight] : insight and judgment were intact [Mood] : the mood was normal [Memory Recent] : recent memory was not impaired [FreeTextEntry1] : nose with skin cancer

## 2019-08-23 NOTE — DISCUSSION/SUMMARY
[FreeTextEntry1] : 83M w/ PMH as above presenting for follow up.  Overall feeling well and compliant with medications.\par \par 1. CAD - s/p PCI to RCA - continue DAPT with ASA and Plavix for 1 year (3/4/2020), after which Plavix can be discontinued and Aspirin 81mg continued.\par 2. HLD - on Lipitor 20 mg PO QHS. Will check lipid panel. Goal LDL <70. \par 3. HTN - well controlled, continue Norvasc 5 mg PO daily.\par 4. Marked Sinus Bradycardia: patient with fatigue. Recommend d/c of Lopressor 25mg BID.\par 5. After discussing benefits of cardiac rehab patient wants to hold off. He walks at home.\par \par \par RTC 3 months

## 2019-09-20 ENCOUNTER — OUTPATIENT (OUTPATIENT)
Dept: OUTPATIENT SERVICES | Facility: HOSPITAL | Age: 84
LOS: 1 days | End: 2019-09-20

## 2019-09-20 DIAGNOSIS — Z96.649 PRESENCE OF UNSPECIFIED ARTIFICIAL HIP JOINT: Chronic | ICD-10-CM

## 2019-10-29 ENCOUNTER — OUTPATIENT (OUTPATIENT)
Dept: OUTPATIENT SERVICES | Facility: HOSPITAL | Age: 84
LOS: 1 days | End: 2019-10-29

## 2019-10-29 DIAGNOSIS — Z96.649 PRESENCE OF UNSPECIFIED ARTIFICIAL HIP JOINT: Chronic | ICD-10-CM

## 2019-11-25 ENCOUNTER — APPOINTMENT (OUTPATIENT)
Dept: CARDIOLOGY | Facility: CLINIC | Age: 84
End: 2019-11-25

## 2019-12-04 ENCOUNTER — APPOINTMENT (OUTPATIENT)
Dept: CARDIOLOGY | Facility: CLINIC | Age: 84
End: 2019-12-04
Payer: MEDICARE

## 2019-12-04 VITALS
HEIGHT: 66 IN | SYSTOLIC BLOOD PRESSURE: 162 MMHG | DIASTOLIC BLOOD PRESSURE: 64 MMHG | HEART RATE: 50 BPM | BODY MASS INDEX: 27.32 KG/M2 | WEIGHT: 170 LBS | OXYGEN SATURATION: 96 %

## 2019-12-04 PROCEDURE — 99214 OFFICE O/P EST MOD 30 MIN: CPT

## 2019-12-04 NOTE — PHYSICAL EXAM
[General Appearance - Well Developed] : well developed [General Appearance - Well Nourished] : well nourished [Normal Appearance] : normal appearance [Well Groomed] : well groomed [No Deformities] : no deformities [General Appearance - In No Acute Distress] : no acute distress [Normal Conjunctiva] : the conjunctiva exhibited no abnormalities [Eyelids - No Xanthelasma] : the eyelids demonstrated no xanthelasmas [Normal Oral Mucosa] : normal oral mucosa [No Oral Cyanosis] : no oral cyanosis [No Oral Pallor] : no oral pallor [Exaggerated Use Of Accessory Muscles For Inspiration] : no accessory muscle use [Respiration, Rhythm And Depth] : normal respiratory rhythm and effort [Murmurs] : no murmurs present [Auscultation Breath Sounds / Voice Sounds] : lungs were clear to auscultation bilaterally [Heart Sounds] : normal S1 and S2 [Abdomen Mass (___ Cm)] : no abdominal mass palpated [Edema] : no peripheral edema present [Nail Clubbing] : no clubbing of the fingernails [Abnormal Walk] : normal gait [Gait - Sufficient For Exercise Testing] : the gait was sufficient for exercise testing [Cyanosis, Localized] : no localized cyanosis [Petechial Hemorrhages (___cm)] : no petechial hemorrhages [No Venous Stasis] : no venous stasis [No Xanthoma] : no  xanthoma was observed [] : no rash [Skin Color & Pigmentation] : normal skin color and pigmentation [Impaired Insight] : insight and judgment were intact [FreeTextEntry1] : nose with skin cancer [Affect] : the affect was normal [Mood] : the mood was normal [Memory Recent] : recent memory was not impaired

## 2019-12-04 NOTE — HISTORY OF PRESENT ILLNESS
[FreeTextEntry1] : 84M w/ PMH of HTN, HLD, BPH, basal cell carcinoma of the nose presents after recent NSTEMI and PCI to his RCA.  [place] [facility] [place] an episode of exertional angina.  Ruled in for MI.  Diagnostic angiography revealed a severe RCA lesion and the patient was transferred to [facility] for high-risk PCI given location of the lesion.  He returns today without chest pain, SOB, LE edema or palpitations.  His wrist site is healing well and he is overall asymptomatic.  Reports compliance with medications. \par \par Feeling well since his last visit. Denies chest pains.  Compliant with DAPT. no bleeding or excessive bruising issues. He has noticed a heaviness in his legs when he walks. Noticed the last few months. Worse with exertion and better with rest.

## 2019-12-04 NOTE — REASON FOR VISIT
[Coronary Artery Disease] : coronary artery disease [Follow-Up - Clinic] : a clinic follow-up of [Hyperlipidemia] : hyperlipidemia [Hypertension] : hypertension [Medication Management] : Medication management [Prior Myocardial Infarction] : a prior myocardial infarction [Spouse] : spouse [Family Member] : family member

## 2019-12-04 NOTE — DISCUSSION/SUMMARY
[FreeTextEntry1] : 84M w/ PMH as above presenting for follow up.  Overall feeling well and compliant with medications.\par \par 1. CAD - s/p PCI to RCA - continue DAPT with ASA and Plavix for 1 year (3/4/2020), after which Plavix can be discontinued and Aspirin 81mg continued.\par 2. HLD - on Lipitor 20 mg PO QHS. LDL in 9/2019 at 94. Will increase atorvastatin to 40mg daily. Recheck lipids in 3 months. \par 3. HTN - well controlled, continue Norvasc 5 mg PO daily.\par 4. Sinus Bradycardia: patient with fatigue. Recommended d/c of Lopressor 25mg BID in the past. Fatigue improved.\par 5. After discussing benefits of cardiac rehab patient wants to hold off. He walks at home.\par 6. claudication: will order JOHANA.\par \par \par RTC 3 months

## 2020-01-28 ENCOUNTER — INPATIENT (INPATIENT)
Facility: HOSPITAL | Age: 85
LOS: 2 days | Discharge: HOME | End: 2020-01-31
Admitting: INTERNAL MEDICINE
Payer: MEDICARE

## 2020-01-28 DIAGNOSIS — Z96.649 PRESENCE OF UNSPECIFIED ARTIFICIAL HIP JOINT: Chronic | ICD-10-CM

## 2020-01-28 PROCEDURE — 71045 X-RAY EXAM CHEST 1 VIEW: CPT | Mod: 26

## 2020-01-28 PROCEDURE — 99285 EMERGENCY DEPT VISIT HI MDM: CPT

## 2020-01-29 PROCEDURE — 33208 INSRT HEART PM ATRIAL & VENT: CPT | Mod: KX

## 2020-01-29 PROCEDURE — 99222 1ST HOSP IP/OBS MODERATE 55: CPT

## 2020-01-29 PROCEDURE — 93306 TTE W/DOPPLER COMPLETE: CPT | Mod: 26

## 2020-01-29 PROCEDURE — 99223 1ST HOSP IP/OBS HIGH 75: CPT

## 2020-01-29 PROCEDURE — 71045 X-RAY EXAM CHEST 1 VIEW: CPT | Mod: 26,77

## 2020-01-29 PROCEDURE — 71045 X-RAY EXAM CHEST 1 VIEW: CPT | Mod: 26

## 2020-01-30 ENCOUNTER — OUTPATIENT (OUTPATIENT)
Dept: OUTPATIENT SERVICES | Facility: HOSPITAL | Age: 85
LOS: 1 days | End: 2020-01-30

## 2020-01-30 DIAGNOSIS — Z96.649 PRESENCE OF UNSPECIFIED ARTIFICIAL HIP JOINT: Chronic | ICD-10-CM

## 2020-01-30 PROCEDURE — 71045 X-RAY EXAM CHEST 1 VIEW: CPT | Mod: 26

## 2020-01-30 PROCEDURE — 99233 SBSQ HOSP IP/OBS HIGH 50: CPT

## 2020-01-30 PROCEDURE — 99024 POSTOP FOLLOW-UP VISIT: CPT

## 2020-01-31 ENCOUNTER — OUTPATIENT (OUTPATIENT)
Dept: OUTPATIENT SERVICES | Facility: HOSPITAL | Age: 85
LOS: 1 days | End: 2020-01-31

## 2020-01-31 DIAGNOSIS — Z96.649 PRESENCE OF UNSPECIFIED ARTIFICIAL HIP JOINT: Chronic | ICD-10-CM

## 2020-01-31 PROCEDURE — 99233 SBSQ HOSP IP/OBS HIGH 50: CPT

## 2020-02-05 ENCOUNTER — APPOINTMENT (OUTPATIENT)
Dept: CT IMAGING | Facility: CLINIC | Age: 85
End: 2020-02-05
Payer: MEDICARE

## 2020-02-05 PROCEDURE — 74176 CT ABD & PELVIS W/O CONTRAST: CPT

## 2020-02-06 ENCOUNTER — APPOINTMENT (OUTPATIENT)
Dept: CARDIOLOGY | Facility: CLINIC | Age: 85
End: 2020-02-06
Payer: MEDICARE

## 2020-02-06 VITALS
BODY MASS INDEX: 26.03 KG/M2 | HEIGHT: 66 IN | OXYGEN SATURATION: 97 % | DIASTOLIC BLOOD PRESSURE: 80 MMHG | WEIGHT: 162 LBS | HEART RATE: 85 BPM | SYSTOLIC BLOOD PRESSURE: 160 MMHG

## 2020-02-06 PROCEDURE — 99024 POSTOP FOLLOW-UP VISIT: CPT

## 2020-02-06 PROCEDURE — 93280 PM DEVICE PROGR EVAL DUAL: CPT

## 2020-02-12 ENCOUNTER — EMERGENCY (EMERGENCY)
Facility: HOSPITAL | Age: 85
LOS: 1 days | End: 2020-02-12
Admitting: EMERGENCY MEDICINE
Payer: MEDICARE

## 2020-02-12 ENCOUNTER — APPOINTMENT (OUTPATIENT)
Dept: CARDIOLOGY | Facility: CLINIC | Age: 85
End: 2020-02-12
Payer: MEDICARE

## 2020-02-12 DIAGNOSIS — Z96.649 PRESENCE OF UNSPECIFIED ARTIFICIAL HIP JOINT: Chronic | ICD-10-CM

## 2020-02-12 PROCEDURE — 71045 X-RAY EXAM CHEST 1 VIEW: CPT | Mod: 26

## 2020-02-12 PROCEDURE — 74175 CTA ABDOMEN W/CONTRAST: CPT | Mod: 26

## 2020-02-12 PROCEDURE — 71275 CT ANGIOGRAPHY CHEST: CPT | Mod: 26

## 2020-02-12 PROCEDURE — 99284 EMERGENCY DEPT VISIT MOD MDM: CPT

## 2020-02-12 PROCEDURE — 74019 RADEX ABDOMEN 2 VIEWS: CPT | Mod: 26

## 2020-02-12 PROCEDURE — 93923 UPR/LXTR ART STDY 3+ LVLS: CPT

## 2020-02-17 ENCOUNTER — APPOINTMENT (OUTPATIENT)
Dept: CARDIOLOGY | Facility: CLINIC | Age: 85
End: 2020-02-17
Payer: MEDICARE

## 2020-02-17 VITALS
HEIGHT: 66 IN | HEART RATE: 84 BPM | DIASTOLIC BLOOD PRESSURE: 62 MMHG | OXYGEN SATURATION: 97 % | WEIGHT: 160 LBS | SYSTOLIC BLOOD PRESSURE: 110 MMHG | BODY MASS INDEX: 25.71 KG/M2

## 2020-02-17 DIAGNOSIS — I73.9 PERIPHERAL VASCULAR DISEASE, UNSPECIFIED: ICD-10-CM

## 2020-02-17 PROCEDURE — 99214 OFFICE O/P EST MOD 30 MIN: CPT

## 2020-02-17 RX ORDER — TAMSULOSIN HYDROCHLORIDE 0.4 MG/1
0.4 CAPSULE ORAL AT BEDTIME
Refills: 0 | Status: ACTIVE | COMMUNITY

## 2020-02-17 NOTE — PHYSICAL EXAM
[General Appearance - Well Nourished] : well nourished [Normal Appearance] : normal appearance [Well Groomed] : well groomed [General Appearance - Well Developed] : well developed [General Appearance - In No Acute Distress] : no acute distress [Normal Conjunctiva] : the conjunctiva exhibited no abnormalities [No Deformities] : no deformities [No Oral Pallor] : no oral pallor [Eyelids - No Xanthelasma] : the eyelids demonstrated no xanthelasmas [No Oral Cyanosis] : no oral cyanosis [Normal Oral Mucosa] : normal oral mucosa [Respiration, Rhythm And Depth] : normal respiratory rhythm and effort [Auscultation Breath Sounds / Voice Sounds] : lungs were clear to auscultation bilaterally [Exaggerated Use Of Accessory Muscles For Inspiration] : no accessory muscle use [Heart Sounds] : normal S1 and S2 [Murmurs] : no murmurs present [Edema] : no peripheral edema present [Abdomen Mass (___ Cm)] : no abdominal mass palpated [Nail Clubbing] : no clubbing of the fingernails [Gait - Sufficient For Exercise Testing] : the gait was sufficient for exercise testing [Abnormal Walk] : normal gait [Skin Color & Pigmentation] : normal skin color and pigmentation [Petechial Hemorrhages (___cm)] : no petechial hemorrhages [Cyanosis, Localized] : no localized cyanosis [] : no rash [No Venous Stasis] : no venous stasis [No Xanthoma] : no  xanthoma was observed [Affect] : the affect was normal [Impaired Insight] : insight and judgment were intact [Memory Recent] : recent memory was not impaired [Mood] : the mood was normal [FreeTextEntry1] : bradycardic, regular rhythm

## 2020-02-17 NOTE — HISTORY OF PRESENT ILLNESS
[FreeTextEntry1] : Historical Perspective:\par 84M w/ PMH of HTN, HLD, BPH, basal cell carcinoma of the nose presents after recent NSTEMI and PCI to his RCA.  Presented to [facility] after an episode of exertional angina.  Ruled in for MI.  Diagnostic angiography revealed a severe RCA lesion and the patient was transferred to [facility] for high-risk PCI given location of the lesion.  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 He underwent dual chamber Medtronic PPM insertion at [facility]. No complications.\par \par Current Health Status:|\par Patient has no chest pain, SOB, or palpitations. No more leg pain with ambulating (JOHANA 2/12/2020 was normal). Patient with urinary obstruction. Currently with Gaffney catheter to leg bag. Scheduled for urological procedure on 3/11/2020 with Dr. Guzman at [facility].

## 2020-02-17 NOTE — DISCUSSION/SUMMARY
[FreeTextEntry1] : 84M w/ PMH as above presenting for follow up.  Overall feeling well and compliant with medications.\par \par 1. CAD - s/p PCI to RCA - continue DAPT with ASA and Plavix for 1 year (3/4/2020), after which Plavix can be discontinued and Aspirin 81mg continued.\par 2. HLD - on Lipitor 40 mg PO QHS. Recheck lipid panel at next follow up vsiit. \par 3. HTN - well controlled, continue Norvasc 5 mg PO daily.\par 4. 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 Patient remote monitoring.\par \par Patient is optimized from a cardiology standpoint to undergo his urological procedure. Plavix will be discontinued on 3/5/2020. Ideally, patient should remain on Aspirin 81mg daily. If absolutely necessary OK to discontinue the Aspirin 5 days prior to urological procedure and resume immediately post-operatively once adequate hemostasis has been achieved.\par \par Follow up in 6 months

## 2020-02-17 NOTE — REASON FOR VISIT
[Follow-Up - Clinic] : a clinic follow-up of [Coronary Artery Disease] : coronary artery disease [Hyperlipidemia] : hyperlipidemia [Hypertension] : hypertension [Medication Management] : Medication management [Prior Myocardial Infarction] : a prior myocardial infarction [Spouse] : spouse [Family Member] : family member [Other: _____] : [unfilled]

## 2020-03-03 ENCOUNTER — EMERGENCY (EMERGENCY)
Facility: HOSPITAL | Age: 85
LOS: 1 days | End: 2020-03-03
Admitting: EMERGENCY MEDICINE

## 2020-03-03 DIAGNOSIS — Z96.649 PRESENCE OF UNSPECIFIED ARTIFICIAL HIP JOINT: Chronic | ICD-10-CM

## 2020-03-04 ENCOUNTER — EMERGENCY (EMERGENCY)
Facility: HOSPITAL | Age: 85
LOS: 1 days | End: 2020-03-04
Admitting: EMERGENCY MEDICINE
Payer: MEDICARE

## 2020-03-04 DIAGNOSIS — Z96.649 PRESENCE OF UNSPECIFIED ARTIFICIAL HIP JOINT: Chronic | ICD-10-CM

## 2020-03-04 PROCEDURE — 99283 EMERGENCY DEPT VISIT LOW MDM: CPT

## 2020-03-12 ENCOUNTER — APPOINTMENT (OUTPATIENT)
Dept: CARDIOLOGY | Facility: CLINIC | Age: 85
End: 2020-03-12
Payer: MEDICARE

## 2020-03-12 VITALS
BODY MASS INDEX: 25.23 KG/M2 | HEART RATE: 84 BPM | DIASTOLIC BLOOD PRESSURE: 64 MMHG | WEIGHT: 157 LBS | SYSTOLIC BLOOD PRESSURE: 128 MMHG | OXYGEN SATURATION: 97 % | HEIGHT: 66 IN

## 2020-03-12 DIAGNOSIS — R00.1 BRADYCARDIA, UNSPECIFIED: ICD-10-CM

## 2020-03-12 PROCEDURE — 99214 OFFICE O/P EST MOD 30 MIN: CPT

## 2020-03-12 RX ORDER — PHENAZOPYRIDINE HYDROCHLORIDE 100 MG/1
100 TABLET ORAL
Qty: 14 | Refills: 0 | Status: ACTIVE | COMMUNITY
Start: 2020-03-04

## 2020-03-12 RX ORDER — CLOPIDOGREL BISULFATE 75 MG/1
75 TABLET, FILM COATED ORAL DAILY
Refills: 0 | Status: DISCONTINUED | COMMUNITY
End: 2020-03-12

## 2020-03-12 NOTE — DISCUSSION/SUMMARY
[FreeTextEntry1] : 84M w/ PMH as above presenting for follow up.  Overall feeling well and compliant with medications.\par \par 1. CAD - s/p PCI to RCA - Completed one year of DAPT. Continue with  ASA 81mg daily.\par 2. HLD - on Lipitor 40 mg PO QHS. Recheck lipid panel at next follow up visit\par 3. HTN - well controlled, continue Norvasc 5 mg PO daily.\par 4. 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 Patient remote monitoring.\par \par Follow up in 3 months

## 2020-03-12 NOTE — HISTORY OF PRESENT ILLNESS
[FreeTextEntry1] : Historical Perspective:\par 84M w/ PMH of HTN, HLD, BPH, basal cell carcinoma of the nose presents after recent NSTEMI and PCI to his RCA.  Presented to [facility] after an episode of exertional angina.  Ruled in for MI.  Diagnostic angiography revealed a severe RCA lesion and the patient was transferred to [facility] for high-risk PCI given location of the lesion.  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 He underwent dual chamber Medtronic PPM insertion at [facility]. No complications.\par \par Current Health Status:|\par Patient has no chest pain, SOB, or palpitations. No more leg pain with ambulating (JOHANA 2/12/2020 was normal). Patient with urinary obstruction. Had his urological procedure. A doctor told him after the procedure he should get his PPM site checked out. Patient has no discharge, erythema, or tenderness around the area. No fever or chills.

## 2020-03-12 NOTE — PHYSICAL EXAM
[General Appearance - Well Developed] : well developed [Normal Appearance] : normal appearance [Well Groomed] : well groomed [General Appearance - Well Nourished] : well nourished [No Deformities] : no deformities [General Appearance - In No Acute Distress] : no acute distress [Normal Conjunctiva] : the conjunctiva exhibited no abnormalities [Eyelids - No Xanthelasma] : the eyelids demonstrated no xanthelasmas [Normal Oral Mucosa] : normal oral mucosa [No Oral Pallor] : no oral pallor [No Oral Cyanosis] : no oral cyanosis [Respiration, Rhythm And Depth] : normal respiratory rhythm and effort [Exaggerated Use Of Accessory Muscles For Inspiration] : no accessory muscle use [Auscultation Breath Sounds / Voice Sounds] : lungs were clear to auscultation bilaterally [Heart Sounds] : normal S1 and S2 [Murmurs] : no murmurs present [Edema] : no peripheral edema present [Abdomen Mass (___ Cm)] : no abdominal mass palpated [Abnormal Walk] : normal gait [Gait - Sufficient For Exercise Testing] : the gait was sufficient for exercise testing [Nail Clubbing] : no clubbing of the fingernails [Cyanosis, Localized] : no localized cyanosis [Petechial Hemorrhages (___cm)] : no petechial hemorrhages [Skin Color & Pigmentation] : normal skin color and pigmentation [] : no rash [No Venous Stasis] : no venous stasis [No Xanthoma] : no  xanthoma was observed [Impaired Insight] : insight and judgment were intact [Affect] : the affect was normal [Mood] : the mood was normal [Memory Recent] : recent memory was not impaired [FreeTextEntry1] : PPM site clean and dry. No erythema, warmth, or tenderness. Healed well. No discharge.

## 2020-06-09 ENCOUNTER — APPOINTMENT (OUTPATIENT)
Dept: CARDIOLOGY | Facility: CLINIC | Age: 85
End: 2020-06-09

## 2020-06-18 ENCOUNTER — APPOINTMENT (OUTPATIENT)
Dept: ULTRASOUND IMAGING | Facility: CLINIC | Age: 85
End: 2020-06-18
Payer: MEDICARE

## 2020-06-18 ENCOUNTER — APPOINTMENT (OUTPATIENT)
Dept: CARDIOLOGY | Facility: CLINIC | Age: 85
End: 2020-06-18

## 2020-06-18 PROCEDURE — 76770 US EXAM ABDO BACK WALL COMP: CPT

## 2020-07-29 ENCOUNTER — OUTPATIENT (OUTPATIENT)
Dept: OUTPATIENT SERVICES | Facility: HOSPITAL | Age: 85
LOS: 1 days | End: 2020-07-29

## 2020-07-29 DIAGNOSIS — Z96.649 PRESENCE OF UNSPECIFIED ARTIFICIAL HIP JOINT: Chronic | ICD-10-CM

## 2020-08-06 ENCOUNTER — APPOINTMENT (OUTPATIENT)
Dept: CARDIOLOGY | Facility: CLINIC | Age: 85
End: 2020-08-06
Payer: MEDICARE

## 2020-08-06 VITALS
SYSTOLIC BLOOD PRESSURE: 120 MMHG | TEMPERATURE: 97.3 F | HEART RATE: 92 BPM | DIASTOLIC BLOOD PRESSURE: 58 MMHG | OXYGEN SATURATION: 98 % | WEIGHT: 160 LBS | HEIGHT: 66 IN | BODY MASS INDEX: 25.71 KG/M2

## 2020-08-06 PROCEDURE — 93280 PM DEVICE PROGR EVAL DUAL: CPT

## 2020-08-29 NOTE — DISCHARGE NOTE ADULT - PLAN OF CARE
lower To Recover from Surgery at home Please Call Dr. Moses's office on Monday to Schedule a follow up appointment within one week. Staples will be removed at your follow up appointment

## 2020-09-03 ENCOUNTER — NON-APPOINTMENT (OUTPATIENT)
Age: 85
End: 2020-09-03

## 2020-09-03 ENCOUNTER — APPOINTMENT (OUTPATIENT)
Dept: CARDIOLOGY | Facility: CLINIC | Age: 85
End: 2020-09-03
Payer: MEDICARE

## 2020-09-03 VITALS
SYSTOLIC BLOOD PRESSURE: 150 MMHG | BODY MASS INDEX: 25.71 KG/M2 | DIASTOLIC BLOOD PRESSURE: 86 MMHG | HEIGHT: 66 IN | OXYGEN SATURATION: 96 % | HEART RATE: 100 BPM | TEMPERATURE: 97.5 F | WEIGHT: 160 LBS

## 2020-09-03 PROCEDURE — 93000 ELECTROCARDIOGRAM COMPLETE: CPT

## 2020-09-03 PROCEDURE — 99214 OFFICE O/P EST MOD 30 MIN: CPT | Mod: 25

## 2020-09-03 NOTE — PHYSICAL EXAM
[General Appearance - Well Developed] : well developed [Normal Appearance] : normal appearance [Well Groomed] : well groomed [General Appearance - Well Nourished] : well nourished [General Appearance - In No Acute Distress] : no acute distress [No Deformities] : no deformities [Normal Conjunctiva] : the conjunctiva exhibited no abnormalities [Eyelids - No Xanthelasma] : the eyelids demonstrated no xanthelasmas [No Oral Pallor] : no oral pallor [No Oral Cyanosis] : no oral cyanosis [Normal Oral Mucosa] : normal oral mucosa [Respiration, Rhythm And Depth] : normal respiratory rhythm and effort [Exaggerated Use Of Accessory Muscles For Inspiration] : no accessory muscle use [Auscultation Breath Sounds / Voice Sounds] : lungs were clear to auscultation bilaterally [Edema] : no peripheral edema present [Murmurs] : no murmurs present [Heart Sounds] : normal S1 and S2 [Abnormal Walk] : normal gait [Gait - Sufficient For Exercise Testing] : the gait was sufficient for exercise testing [Nail Clubbing] : no clubbing of the fingernails [Petechial Hemorrhages (___cm)] : no petechial hemorrhages [Cyanosis, Localized] : no localized cyanosis [Skin Color & Pigmentation] : normal skin color and pigmentation [] : no rash [No Venous Stasis] : no venous stasis [No Xanthoma] : no  xanthoma was observed [Affect] : the affect was normal [Impaired Insight] : insight and judgment were intact [Mood] : the mood was normal [Memory Recent] : recent memory was not impaired [Heart Rate And Rhythm] : heart rate and rhythm were normal [FreeTextEntry1] : PPM site clean and dry. No erythema, warmth, or tenderness. Healed well. No discharge.

## 2020-09-03 NOTE — DISCUSSION/SUMMARY
[FreeTextEntry1] : 1. CAD - s/p PCI to RCA - Completed one year of DAPT. Continue with  ASA 81mg daily.\par 2. HLD - on Lipitor 40 mg PO QHS. Recheck lipid panel at next follow up visit\par 3. HTN - well controlled, continue Norvasc 5 mg PO daily.\par 4. 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 Patient remote monitoring.\par \par Follow up in 6 months

## 2020-09-03 NOTE — REASON FOR VISIT
[Follow-Up - Clinic] : a clinic follow-up of [Hyperlipidemia] : hyperlipidemia [Coronary Artery Disease] : coronary artery disease [Medication Management] : Medication management [Hypertension] : hypertension [Prior Myocardial Infarction] : a prior myocardial infarction [Spouse] : spouse [Other: _____] : [unfilled] [Family Member] : family member

## 2020-09-03 NOTE — HISTORY OF PRESENT ILLNESS
[FreeTextEntry1] : Historical Perspective:\par 84M w/ PMH of HTN, HLD, BPH, basal cell carcinoma of the nose presents after recent NSTEMI and PCI to his RCA.  Presented to [facility] after an episode of exertional angina.  Ruled in for MI.  Diagnostic angiography revealed a severe RCA lesion and the patient was transferred to [facility] for high-risk PCI given location of the lesion.  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 He underwent dual chamber Medtronic PPM insertion at [facility]. No complications.\par \par Current Health Status:|\par Patient with no chest pain, SOB, or palpitations. No hospitalizations since seeing me last. Remains compliant with his medications and reports no adverse effects.\par

## 2020-11-11 ENCOUNTER — APPOINTMENT (OUTPATIENT)
Dept: CARDIOLOGY | Facility: CLINIC | Age: 85
End: 2020-11-11
Payer: MEDICARE

## 2020-11-11 VITALS
OXYGEN SATURATION: 97 % | WEIGHT: 161 LBS | TEMPERATURE: 97.7 F | DIASTOLIC BLOOD PRESSURE: 78 MMHG | HEIGHT: 66 IN | HEART RATE: 84 BPM | SYSTOLIC BLOOD PRESSURE: 128 MMHG | BODY MASS INDEX: 25.88 KG/M2

## 2020-11-11 PROCEDURE — 93280 PM DEVICE PROGR EVAL DUAL: CPT

## 2021-01-25 NOTE — PROGRESS NOTE ADULT - ASSESSMENT
83 year old M s/p Right burrhole for drainage of chronic subdural, doing well denies pain/discomfort

## 2021-03-05 ENCOUNTER — NON-APPOINTMENT (OUTPATIENT)
Age: 86
End: 2021-03-05

## 2021-03-05 ENCOUNTER — APPOINTMENT (OUTPATIENT)
Dept: CARDIOLOGY | Facility: CLINIC | Age: 86
End: 2021-03-05
Payer: MEDICARE

## 2021-03-05 PROCEDURE — 93296 REM INTERROG EVL PM/IDS: CPT

## 2021-03-05 PROCEDURE — 93294 REM INTERROG EVL PM/LDLS PM: CPT

## 2021-03-10 ENCOUNTER — NON-APPOINTMENT (OUTPATIENT)
Age: 86
End: 2021-03-10

## 2021-03-10 ENCOUNTER — APPOINTMENT (OUTPATIENT)
Dept: CARDIOLOGY | Facility: CLINIC | Age: 86
End: 2021-03-10
Payer: MEDICARE

## 2021-03-10 VITALS
RESPIRATION RATE: 10 BRPM | SYSTOLIC BLOOD PRESSURE: 142 MMHG | HEART RATE: 71 BPM | DIASTOLIC BLOOD PRESSURE: 82 MMHG | WEIGHT: 161 LBS | HEIGHT: 66 IN | OXYGEN SATURATION: 98 % | BODY MASS INDEX: 25.88 KG/M2 | TEMPERATURE: 98.2 F

## 2021-03-10 VITALS
HEIGHT: 66 IN | SYSTOLIC BLOOD PRESSURE: 142 MMHG | DIASTOLIC BLOOD PRESSURE: 82 MMHG | HEART RATE: 71 BPM | WEIGHT: 161 LBS | TEMPERATURE: 98.2 F | BODY MASS INDEX: 25.88 KG/M2 | OXYGEN SATURATION: 98 %

## 2021-03-10 PROCEDURE — 93000 ELECTROCARDIOGRAM COMPLETE: CPT

## 2021-03-10 PROCEDURE — 99214 OFFICE O/P EST MOD 30 MIN: CPT

## 2021-03-10 NOTE — PHYSICAL EXAM
[FreeTextEntry1] : PPM site clean and dry. No erythema, warmth, or tenderness. Healed well. No discharge.

## 2021-03-10 NOTE — DISCUSSION/SUMMARY
[FreeTextEntry1] : 1. CAD/Hx of NSTEMI - s/p PCI to RCA - Completed one year of DAPT. Continue with  ASA 81mg daily.\par 2. HLD - on Lipitor 40 mg PO QHS. Recheck lipid panel\par 3. HTN - well controlled, continue Norvasc 5 mg PO daily.\par 4. 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 Patient remote monitoring. Echocardiogram ordered\par \par Follow up in 6 months

## 2021-03-10 NOTE — HISTORY OF PRESENT ILLNESS
[FreeTextEntry1] : Historical Perspective:\par 84M w/ PMH of HTN, HLD, BPH, basal cell carcinoma of the nose presents after recent NSTEMI and PCI to his RCA.  [place] [facility] [place] an episode of exertional angina.  Ruled in for MI.  Diagnostic angiography revealed a severe RCA lesion and the patient was transferred to [facility] for high-risk PCI given location of the lesion.  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 He underwent dual chamber Medtronic PPM insertion at [facility]. No complications.\par \par Current Health Status:\par Patient with no chest pain, SOB, or palpitations. No hospitalizations since seeing me last. Remains compliant with his medications and reports no adverse effects.\par

## 2021-03-12 ENCOUNTER — APPOINTMENT (OUTPATIENT)
Dept: CARDIOLOGY | Facility: CLINIC | Age: 86
End: 2021-03-12
Payer: MEDICARE

## 2021-03-12 PROCEDURE — 93306 TTE W/DOPPLER COMPLETE: CPT

## 2021-03-17 ENCOUNTER — NON-APPOINTMENT (OUTPATIENT)
Age: 86
End: 2021-03-17

## 2021-05-18 ENCOUNTER — NON-APPOINTMENT (OUTPATIENT)
Age: 86
End: 2021-05-18

## 2021-05-18 ENCOUNTER — APPOINTMENT (OUTPATIENT)
Dept: CARDIOLOGY | Facility: CLINIC | Age: 86
End: 2021-05-18
Payer: MEDICARE

## 2021-05-18 VITALS
SYSTOLIC BLOOD PRESSURE: 142 MMHG | BODY MASS INDEX: 25.23 KG/M2 | HEART RATE: 88 BPM | OXYGEN SATURATION: 95 % | WEIGHT: 157 LBS | TEMPERATURE: 97.7 F | DIASTOLIC BLOOD PRESSURE: 62 MMHG | HEIGHT: 66 IN

## 2021-05-18 PROCEDURE — 93000 ELECTROCARDIOGRAM COMPLETE: CPT

## 2021-05-18 PROCEDURE — 99214 OFFICE O/P EST MOD 30 MIN: CPT | Mod: 25

## 2021-05-18 NOTE — DISCUSSION/SUMMARY
[FreeTextEntry1] : 1. CAD/Hx of NSTEMI - s/p PCI to RCA - Completed one year of DAPT. Continue with ASA 81mg daily. Non-obstructive disease in other coronary artery territories. Because of the atypical chest pain recommend pharmacologic nuclear stress testing. Patient with LBBB and PPM so needs to be pharmacologic. \par 2. HLD - on Lipitor 40 mg PO QHS. Rechecked lipid panel. LDL in the 90s. Did not tolerate atorvastatin 80mg daily, due to myalgias.  Recommend starting Zetia 10mg daily\par 3. HTN - well controlled, continue Norvasc 5 mg PO daily.\par 4. 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 Patient remote monitoring.\par 5. AI: periodic echo surveillance. \par \par Follow up in 4 months.

## 2021-05-18 NOTE — REVIEW OF SYSTEMS
[Joint Pain] : joint pain [Joint Stiffness] : joint stiffness [Negative] : Neurological [Easy Bleeding] : no tendency for easy bleeding [Easy Bruising] : no tendency for easy bruising [FreeTextEntry5] : see HPI

## 2021-05-18 NOTE — PHYSICAL EXAM
[Normal] : moves all extremities, no focal deficits, normal speech [de-identified] : No JVD, no carotid artery bruits auscultated bilaterally [de-identified] : 2/6 systolic ejection murmur RUSB

## 2021-05-18 NOTE — HISTORY OF PRESENT ILLNESS
[FreeTextEntry1] : Historical Perspective:\par 85M w/ PMH of HTN, HLD, BPH, basal cell carcinoma of the nose presents after recent NSTEMI and PCI to his RCA. Presented to [facility] after an episode of exertional angina. Ruled in for MI. Diagnostic angiography revealed a severe RCA lesion and the patient was transferred to [facility] for high-risk PCI given location of the lesion. 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 He underwent dual chamber Medtronic PPM insertion at [facility]. No complications.\par \par Current Health Status:\par Patient with no SOB, or palpitations. No hospitalizations since seeing me last. Remains compliant with his medications and reports no adverse effects. He does report a discomfort in his chest the last 2-3 days. Described as a moving sensation in his chest. Random. No associated symptoms.

## 2021-05-18 NOTE — CARDIOLOGY SUMMARY
[de-identified] : 5/18/2021, NSR with first degree AV Block, LBBB [de-identified] : 3/12/2021, LV EF 50%, trace MR, mild AI, hypokinesis of the inferior wall, mild TR with estimated PASP of 24mmHg. [de-identified] : Stent: 3/4/19: RCA

## 2021-06-08 ENCOUNTER — APPOINTMENT (OUTPATIENT)
Dept: CARDIOLOGY | Facility: CLINIC | Age: 86
End: 2021-06-08
Payer: MEDICARE

## 2021-06-08 ENCOUNTER — NON-APPOINTMENT (OUTPATIENT)
Age: 86
End: 2021-06-08

## 2021-06-08 PROCEDURE — G2066: CPT

## 2021-06-08 PROCEDURE — 93298 REM INTERROG DEV EVAL SCRMS: CPT

## 2021-06-11 ENCOUNTER — APPOINTMENT (OUTPATIENT)
Dept: CARDIOLOGY | Facility: CLINIC | Age: 86
End: 2021-06-11

## 2021-07-02 ENCOUNTER — APPOINTMENT (OUTPATIENT)
Dept: CARDIOLOGY | Facility: CLINIC | Age: 86
End: 2021-07-02

## 2021-08-27 ENCOUNTER — APPOINTMENT (OUTPATIENT)
Dept: CARDIOLOGY | Facility: CLINIC | Age: 86
End: 2021-08-27

## 2021-09-07 ENCOUNTER — APPOINTMENT (OUTPATIENT)
Dept: CARDIOLOGY | Facility: CLINIC | Age: 86
End: 2021-09-07
Payer: MEDICARE

## 2021-09-07 ENCOUNTER — NON-APPOINTMENT (OUTPATIENT)
Age: 86
End: 2021-09-07

## 2021-09-07 PROCEDURE — 93294 REM INTERROG EVL PM/LDLS PM: CPT

## 2021-09-07 PROCEDURE — 93296 REM INTERROG EVL PM/IDS: CPT

## 2021-09-22 ENCOUNTER — APPOINTMENT (OUTPATIENT)
Dept: CARDIOLOGY | Facility: CLINIC | Age: 86
End: 2021-09-22

## 2021-10-06 ENCOUNTER — APPOINTMENT (OUTPATIENT)
Dept: CARDIOLOGY | Facility: CLINIC | Age: 86
End: 2021-10-06
Payer: MEDICARE

## 2021-10-06 VITALS
TEMPERATURE: 97.1 F | SYSTOLIC BLOOD PRESSURE: 128 MMHG | DIASTOLIC BLOOD PRESSURE: 62 MMHG | BODY MASS INDEX: 26.03 KG/M2 | HEART RATE: 76 BPM | WEIGHT: 162 LBS | HEIGHT: 66 IN | OXYGEN SATURATION: 97 %

## 2021-10-06 DIAGNOSIS — I25.2 OLD MYOCARDIAL INFARCTION: ICD-10-CM

## 2021-10-06 PROCEDURE — 99214 OFFICE O/P EST MOD 30 MIN: CPT

## 2021-10-06 RX ORDER — HYDROCORTISONE 25 MG/G
2.5 CREAM TOPICAL
Qty: 30 | Refills: 0 | Status: DISCONTINUED | COMMUNITY
Start: 2020-03-04 | End: 2021-10-06

## 2021-10-06 RX ORDER — SULFAMETHOXAZOLE AND TRIMETHOPRIM 800; 160 MG/1; MG/1
800-160 TABLET ORAL
Qty: 14 | Refills: 0 | Status: DISCONTINUED | COMMUNITY
Start: 2020-03-04 | End: 2021-10-06

## 2021-10-06 NOTE — REVIEW OF SYSTEMS
[Joint Pain] : joint pain [Joint Stiffness] : joint stiffness [Easy Bleeding] : no tendency for easy bleeding [Easy Bruising] : no tendency for easy bruising [Negative] : Neurological [FreeTextEntry5] : see HPI

## 2021-10-06 NOTE — HISTORY OF PRESENT ILLNESS
[FreeTextEntry1] : Historical Perspective:\par 85M w/ PMH of HTN, HLD, BPH, basal cell carcinoma of the nose presents after recent NSTEMI and PCI to his RCA. Presented to [facility] after an episode of exertional angina. Ruled in for MI. Diagnostic angiography revealed a severe RCA lesion and the patient was transferred to [facility] for high-risk PCI given location of the lesion. 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 He underwent dual chamber Medtronic PPM insertion at [facility] [place]. No complications.\par \par Current Health Status:\par Patient with no SOB, or palpitations. No hospitalizations since seeing me last. Remains compliant with his medications and reports no adverse effects. Patient did report a discomfort in his chest the last 2-3 days in May 2021, when I saw him last. I ordered stress testing. He cancelled because the atypical pain resolved and has not retuned.  Described as a moving sensation in his chest. Random. No associated symptoms.

## 2021-10-06 NOTE — PHYSICAL EXAM
[Normal] : moves all extremities, no focal deficits, normal speech [de-identified] : No JVD, no carotid artery bruits auscultated bilaterally [de-identified] : 2/6 systolic ejection murmur RUSB

## 2021-10-06 NOTE — CARDIOLOGY SUMMARY
[de-identified] : 5/18/2021, NSR with first degree AV Block, LBBB [de-identified] : 3/12/2021, LV EF 50%, trace MR, mild AI, hypokinesis of the inferior wall, mild TR with estimated PASP of 24mmHg. [de-identified] : Stent: 3/4/19: RCA

## 2021-10-06 NOTE — DISCUSSION/SUMMARY
[FreeTextEntry1] : 1. CAD/Hx of NSTEMI - s/p PCI to RCA - Completed one year of DAPT. Continue with ASA 81mg daily. Non-obstructive disease in other coronary artery territories.  Patient did report a discomfort in his chest the last 2-3 days in May 2021, when I saw him last. I ordered stress testing. He cancelled because the atypical pain resolved and has not retuned.  Described as a moving sensation in his chest. Random. No associated symptoms. Doesn't want workup at this time. Will let us know if symptoms return.\par 2. HLD - on Lipitor 40 mg PO QHS. Rechecked lipid panel. LDL in the 90s. Did not tolerate atorvastatin 80mg daily, due to myalgias.  Recommend starting Zetia 10mg daily\par 3. HTN - well controlled, continue Norvasc 5 mg PO daily.\par 4. 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 Patient remote monitoring.\par 5. AI: periodic echo surveillance. \par \par Follow up in 6 months.

## 2021-12-07 ENCOUNTER — NON-APPOINTMENT (OUTPATIENT)
Age: 86
End: 2021-12-07

## 2021-12-07 ENCOUNTER — APPOINTMENT (OUTPATIENT)
Dept: CARDIOLOGY | Facility: CLINIC | Age: 86
End: 2021-12-07
Payer: MEDICARE

## 2021-12-07 PROCEDURE — 93296 REM INTERROG EVL PM/IDS: CPT

## 2021-12-07 PROCEDURE — 93294 REM INTERROG EVL PM/LDLS PM: CPT | Mod: NC

## 2022-02-03 ENCOUNTER — APPOINTMENT (OUTPATIENT)
Dept: ULTRASOUND IMAGING | Facility: CLINIC | Age: 87
End: 2022-02-03
Payer: MEDICARE

## 2022-02-03 PROCEDURE — 76770 US EXAM ABDO BACK WALL COMP: CPT

## 2022-03-08 ENCOUNTER — NON-APPOINTMENT (OUTPATIENT)
Age: 87
End: 2022-03-08

## 2022-03-08 ENCOUNTER — APPOINTMENT (OUTPATIENT)
Dept: CARDIOLOGY | Facility: CLINIC | Age: 87
End: 2022-03-08
Payer: MEDICARE

## 2022-03-08 PROCEDURE — 93296 REM INTERROG EVL PM/IDS: CPT

## 2022-03-08 PROCEDURE — 93294 REM INTERROG EVL PM/LDLS PM: CPT

## 2022-04-04 ENCOUNTER — APPOINTMENT (OUTPATIENT)
Dept: CARDIOLOGY | Facility: CLINIC | Age: 87
End: 2022-04-04
Payer: MEDICARE

## 2022-04-04 ENCOUNTER — NON-APPOINTMENT (OUTPATIENT)
Age: 87
End: 2022-04-04

## 2022-04-04 VITALS
WEIGHT: 158 LBS | DIASTOLIC BLOOD PRESSURE: 70 MMHG | SYSTOLIC BLOOD PRESSURE: 130 MMHG | OXYGEN SATURATION: 97 % | BODY MASS INDEX: 25.5 KG/M2 | HEART RATE: 84 BPM

## 2022-04-04 DIAGNOSIS — R07.89 OTHER CHEST PAIN: ICD-10-CM

## 2022-04-04 PROCEDURE — 93000 ELECTROCARDIOGRAM COMPLETE: CPT

## 2022-04-04 PROCEDURE — 99214 OFFICE O/P EST MOD 30 MIN: CPT | Mod: 25

## 2022-04-04 NOTE — PHYSICAL EXAM
[Normal] : moves all extremities, no focal deficits, normal speech [de-identified] : No carotid artery bruits auscultated bilaterally [de-identified] : 2/6 systolic ejection murmur RUSB

## 2022-04-04 NOTE — HISTORY OF PRESENT ILLNESS
[FreeTextEntry1] : Historical Perspective:\par 86M w/ PMH of HTN, HLD, BPH, basal cell carcinoma of the nose presents after recent NSTEMI and PCI to his RCA. Presented to [facility] after an episode of exertional angina. Ruled in for MI. Diagnostic angiography revealed a severe RCA lesion and the patient was transferred to [facility] for high-risk PCI given location of the lesion. 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 He underwent dual chamber Medtronic PPM insertion at [facility]. No complications.\par \par Current Health Status:\par Patient with no SOB, or palpitations. No hospitalizations since seeing me last. Remains compliant with his medications and reports no adverse effects. Patient did report a discomfort in his chest the last 2-3 days in May 2021, when I saw him last. I ordered stress testing. He cancelled because the atypical pain resolved and has not retuned.  Described as a moving sensation in his chest. Random. No associated symptoms.

## 2022-04-04 NOTE — DISCUSSION/SUMMARY
[FreeTextEntry1] : 1. CAD/Hx of NSTEMI - s/p PCI to RCA - Completed one year of DAPT. Continue with ASA 81mg daily. Non-obstructive disease in other coronary artery territories.  Patient did report a discomfort in his chest the last 2-3 days in May 2021, when I saw him last. I ordered stress testing. He cancelled because the atypical pain resolved and has not retuned.  Described as a moving sensation in his chest. Random. No associated symptoms. Doesn't want workup at this time. Will let us know if symptoms return.\par 2. HLD - on Lipitor 40 mg PO QHS. Rechecked lipid panel. LDL in the 90s. Did not tolerate atorvastatin 80mg daily, due to myalgias. Started on Zetia 10mg daily, Repeat lipid panel, March 2022, revealed LDL 68.\par 3. HTN - well controlled, continue Norvasc 5 mg PO daily.\par 4. 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 Patient remote monitoring.\par 5. AI: periodic echo surveillance. \par \par Follow up in 6 months.

## 2022-04-04 NOTE — CARDIOLOGY SUMMARY
[de-identified] : 04/4/2022, NSR with first degree AV Block, LBBB [de-identified] : 3/12/2021, LV EF 50%, trace MR, mild AI, hypokinesis of the inferior wall, mild TR with estimated PASP of 24mmHg. [de-identified] : Stent: 3/4/19: RCA

## 2022-04-19 ENCOUNTER — EMERGENCY (EMERGENCY)
Facility: HOSPITAL | Age: 87
LOS: 1 days | Discharge: HOME | End: 2022-04-19
Admitting: EMERGENCY MEDICINE
Payer: OTHER GOVERNMENT

## 2022-04-19 DIAGNOSIS — Z96.649 PRESENCE OF UNSPECIFIED ARTIFICIAL HIP JOINT: Chronic | ICD-10-CM

## 2022-04-19 PROCEDURE — 99285 EMERGENCY DEPT VISIT HI MDM: CPT | Mod: FS

## 2022-04-19 PROCEDURE — 76870 US EXAM SCROTUM: CPT | Mod: 26

## 2022-04-19 PROCEDURE — 71045 X-RAY EXAM CHEST 1 VIEW: CPT | Mod: 26

## 2022-04-20 DIAGNOSIS — J11.1 INFLUENZA DUE TO UNIDENTIFIED INFLUENZA VIRUS WITH OTHER RESPIRATORY MANIFESTATIONS: ICD-10-CM

## 2022-04-20 DIAGNOSIS — R53.1 WEAKNESS: ICD-10-CM

## 2022-04-20 DIAGNOSIS — I25.10 ATHEROSCLEROTIC HEART DISEASE OF NATIVE CORONARY ARTERY WITHOUT ANGINA PECTORIS: ICD-10-CM

## 2022-04-20 DIAGNOSIS — I10 ESSENTIAL (PRIMARY) HYPERTENSION: ICD-10-CM

## 2022-04-20 DIAGNOSIS — J10.1 INFLUENZA DUE TO OTHER IDENTIFIED INFLUENZA VIRUS WITH OTHER RESPIRATORY MANIFESTATIONS: ICD-10-CM

## 2022-04-20 DIAGNOSIS — I25.2 OLD MYOCARDIAL INFARCTION: ICD-10-CM

## 2022-04-20 DIAGNOSIS — Z95.5 PRESENCE OF CORONARY ANGIOPLASTY IMPLANT AND GRAFT: ICD-10-CM

## 2022-04-20 DIAGNOSIS — N40.0 BENIGN PROSTATIC HYPERPLASIA WITHOUT LOWER URINARY TRACT SYMPTOMS: ICD-10-CM

## 2022-04-20 DIAGNOSIS — R50.9 FEVER, UNSPECIFIED: ICD-10-CM

## 2022-04-25 ENCOUNTER — TRANSCRIPTION ENCOUNTER (OUTPATIENT)
Age: 87
End: 2022-04-25

## 2022-05-10 ENCOUNTER — OUTPATIENT (OUTPATIENT)
Dept: OUTPATIENT SERVICES | Facility: HOSPITAL | Age: 87
LOS: 1 days | End: 2022-05-10

## 2022-05-10 DIAGNOSIS — E78.5 HYPERLIPIDEMIA, UNSPECIFIED: ICD-10-CM

## 2022-05-10 DIAGNOSIS — D51.9 VITAMIN B12 DEFICIENCY ANEMIA, UNSPECIFIED: ICD-10-CM

## 2022-05-10 DIAGNOSIS — Z96.649 PRESENCE OF UNSPECIFIED ARTIFICIAL HIP JOINT: Chronic | ICD-10-CM

## 2022-05-10 DIAGNOSIS — N40.0 BENIGN PROSTATIC HYPERPLASIA WITHOUT LOWER URINARY TRACT SYMPTOMS: ICD-10-CM

## 2022-05-10 DIAGNOSIS — I10 ESSENTIAL (PRIMARY) HYPERTENSION: ICD-10-CM

## 2022-05-10 DIAGNOSIS — R41.3 OTHER AMNESIA: ICD-10-CM

## 2022-05-10 DIAGNOSIS — R73.9 HYPERGLYCEMIA, UNSPECIFIED: ICD-10-CM

## 2022-05-11 ENCOUNTER — APPOINTMENT (OUTPATIENT)
Dept: CARDIOLOGY | Facility: CLINIC | Age: 87
End: 2022-05-11
Payer: MEDICARE

## 2022-05-11 PROCEDURE — 93306 TTE W/DOPPLER COMPLETE: CPT

## 2022-06-07 ENCOUNTER — NON-APPOINTMENT (OUTPATIENT)
Age: 87
End: 2022-06-07

## 2022-06-07 ENCOUNTER — APPOINTMENT (OUTPATIENT)
Dept: CARDIOLOGY | Facility: CLINIC | Age: 87
End: 2022-06-07
Payer: MEDICARE

## 2022-06-07 PROCEDURE — 93296 REM INTERROG EVL PM/IDS: CPT

## 2022-06-07 PROCEDURE — 93294 REM INTERROG EVL PM/LDLS PM: CPT

## 2022-07-25 PROCEDURE — 71045 X-RAY EXAM CHEST 1 VIEW: CPT | Mod: 26

## 2022-07-25 PROCEDURE — G1004: CPT

## 2022-07-25 PROCEDURE — 93010 ELECTROCARDIOGRAM REPORT: CPT

## 2022-07-25 PROCEDURE — 99285 EMERGENCY DEPT VISIT HI MDM: CPT

## 2022-07-25 PROCEDURE — 70450 CT HEAD/BRAIN W/O DYE: CPT | Mod: 26,MG

## 2022-07-26 ENCOUNTER — OUTPATIENT (OUTPATIENT)
Dept: OUTPATIENT SERVICES | Facility: HOSPITAL | Age: 87
LOS: 1 days | End: 2022-07-26

## 2022-07-26 ENCOUNTER — INPATIENT (INPATIENT)
Facility: HOSPITAL | Age: 87
LOS: 0 days | Discharge: HOME | End: 2022-07-26
Attending: STUDENT IN AN ORGANIZED HEALTH CARE EDUCATION/TRAINING PROGRAM

## 2022-07-26 DIAGNOSIS — Z96.649 PRESENCE OF UNSPECIFIED ARTIFICIAL HIP JOINT: Chronic | ICD-10-CM

## 2022-07-26 PROCEDURE — 99222 1ST HOSP IP/OBS MODERATE 55: CPT

## 2022-08-02 ENCOUNTER — NON-APPOINTMENT (OUTPATIENT)
Age: 87
End: 2022-08-02

## 2022-08-03 DIAGNOSIS — I25.2 OLD MYOCARDIAL INFARCTION: ICD-10-CM

## 2022-08-03 DIAGNOSIS — Z20.822 CONTACT WITH AND (SUSPECTED) EXPOSURE TO COVID-19: ICD-10-CM

## 2022-08-03 DIAGNOSIS — R82.998 OTHER ABNORMAL FINDINGS IN URINE: ICD-10-CM

## 2022-08-03 DIAGNOSIS — N40.0 BENIGN PROSTATIC HYPERPLASIA WITHOUT LOWER URINARY TRACT SYMPTOMS: ICD-10-CM

## 2022-08-03 DIAGNOSIS — Z87.891 PERSONAL HISTORY OF NICOTINE DEPENDENCE: ICD-10-CM

## 2022-08-03 DIAGNOSIS — R31.9 HEMATURIA, UNSPECIFIED: ICD-10-CM

## 2022-08-03 DIAGNOSIS — I10 ESSENTIAL (PRIMARY) HYPERTENSION: ICD-10-CM

## 2022-08-03 DIAGNOSIS — Z95.5 PRESENCE OF CORONARY ANGIOPLASTY IMPLANT AND GRAFT: ICD-10-CM

## 2022-08-03 DIAGNOSIS — R41.82 ALTERED MENTAL STATUS, UNSPECIFIED: ICD-10-CM

## 2022-08-03 DIAGNOSIS — Z85.72 PERSONAL HISTORY OF NON-HODGKIN LYMPHOMAS: ICD-10-CM

## 2022-08-03 DIAGNOSIS — E87.1 HYPO-OSMOLALITY AND HYPONATREMIA: ICD-10-CM

## 2022-08-03 DIAGNOSIS — Z92.21 PERSONAL HISTORY OF ANTINEOPLASTIC CHEMOTHERAPY: ICD-10-CM

## 2022-08-03 DIAGNOSIS — Z95.0 PRESENCE OF CARDIAC PACEMAKER: ICD-10-CM

## 2022-08-03 DIAGNOSIS — R45.1 RESTLESSNESS AND AGITATION: ICD-10-CM

## 2022-08-03 DIAGNOSIS — I25.10 ATHEROSCLEROTIC HEART DISEASE OF NATIVE CORONARY ARTERY WITHOUT ANGINA PECTORIS: ICD-10-CM

## 2022-08-18 DIAGNOSIS — R41.82 ALTERED MENTAL STATUS, UNSPECIFIED: ICD-10-CM

## 2022-09-06 ENCOUNTER — NON-APPOINTMENT (OUTPATIENT)
Age: 87
End: 2022-09-06

## 2022-09-06 ENCOUNTER — APPOINTMENT (OUTPATIENT)
Dept: CARDIOLOGY | Facility: CLINIC | Age: 87
End: 2022-09-06

## 2022-09-06 PROCEDURE — 93296 REM INTERROG EVL PM/IDS: CPT

## 2022-09-06 PROCEDURE — 93294 REM INTERROG EVL PM/LDLS PM: CPT

## 2022-10-10 ENCOUNTER — NON-APPOINTMENT (OUTPATIENT)
Age: 87
End: 2022-10-10

## 2022-10-10 ENCOUNTER — APPOINTMENT (OUTPATIENT)
Dept: CARDIOLOGY | Facility: CLINIC | Age: 87
End: 2022-10-10

## 2022-10-10 VITALS
DIASTOLIC BLOOD PRESSURE: 70 MMHG | HEART RATE: 88 BPM | WEIGHT: 156 LBS | OXYGEN SATURATION: 96 % | TEMPERATURE: 97.1 F | HEIGHT: 66 IN | BODY MASS INDEX: 25.07 KG/M2 | SYSTOLIC BLOOD PRESSURE: 140 MMHG

## 2022-10-10 PROCEDURE — 93000 ELECTROCARDIOGRAM COMPLETE: CPT

## 2022-10-10 PROCEDURE — 99214 OFFICE O/P EST MOD 30 MIN: CPT

## 2022-10-10 NOTE — PHYSICAL EXAM
[Normal] : moves all extremities, no focal deficits, normal speech [de-identified] : No carotid artery bruits auscultated bilaterally [de-identified] : 2/6 systolic ejection murmur RUSB

## 2022-10-10 NOTE — DISCUSSION/SUMMARY
[FreeTextEntry1] : 1. CAD/Hx of NSTEMI - s/p PCI to RCA - Completed one year of DAPT. Continue with ASA 81mg daily. Non-obstructive disease in other coronary artery territories.  Patient did report a discomfort in his chest the for 2-3 days in May 2021. I ordered stress testing. He cancelled because the atypical pain resolved and has not retuned.  Described as a moving sensation in his chest. Random. No associated symptoms. Doesn't want workup at this time. Will let us know if symptoms return.\par 2. HLD - on Lipitor 40 mg PO QHS. Rechecked lipid panel. LDL in the 90s. Did not tolerate atorvastatin 80mg daily, due to myalgias. Started on Zetia 10mg daily, Repeat lipid panel, March 2022, revealed LDL 68.\par 3. HTN - well controlled, continue Norvasc 5 mg PO daily.\par 4. 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 Patient remote monitoring.\par 5. AI: periodic echo surveillance. \par \par Follow up in 6 months.  [EKG obtained to assist in diagnosis and management of assessed problem(s)] : EKG obtained to assist in diagnosis and management of assessed problem(s)

## 2022-10-10 NOTE — HISTORY OF PRESENT ILLNESS
[FreeTextEntry1] : Historical Perspective:\par 86M w/ PMH of HTN, HLD, BPH, basal cell carcinoma of the nose presents after recent NSTEMI and PCI to his RCA. Presented to [facility] [place] after an episode of exertional angina. Ruled in for MI. Diagnostic angiography revealed a severe RCA lesion and the patient was transferred to [facility] for high-risk PCI given location of the lesion. 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 He underwent dual chamber Medtronic PPM insertion at [facility] [place]. No complications.\par \par Current Health Status:\par Patient with no SOB, or palpitations. No hospitalizations since seeing me last. Remains compliant with his medications and reports no adverse effects.

## 2022-10-10 NOTE — CARDIOLOGY SUMMARY
[de-identified] : 10/10/2022, NSR with first degree AV Block, LBBB [de-identified] : 3/12/2021, LV EF 50%, trace MR, mild AI, hypokinesis of the inferior wall, mild TR with estimated PASP of 24mmHg. [de-identified] : Stent: 3/4/19: RCA

## 2022-12-06 ENCOUNTER — APPOINTMENT (OUTPATIENT)
Dept: CARDIOLOGY | Facility: CLINIC | Age: 87
End: 2022-12-06

## 2022-12-06 ENCOUNTER — NON-APPOINTMENT (OUTPATIENT)
Age: 87
End: 2022-12-06

## 2022-12-06 PROCEDURE — 93294 REM INTERROG EVL PM/LDLS PM: CPT

## 2022-12-06 PROCEDURE — 93296 REM INTERROG EVL PM/IDS: CPT

## 2023-03-07 ENCOUNTER — APPOINTMENT (OUTPATIENT)
Dept: CARDIOLOGY | Facility: CLINIC | Age: 88
End: 2023-03-07
Payer: MEDICARE

## 2023-03-07 ENCOUNTER — NON-APPOINTMENT (OUTPATIENT)
Age: 88
End: 2023-03-07

## 2023-03-07 PROCEDURE — 93294 REM INTERROG EVL PM/LDLS PM: CPT

## 2023-03-07 PROCEDURE — 93296 REM INTERROG EVL PM/IDS: CPT

## 2023-03-30 NOTE — H&P ADULT - PROBLEM SELECTOR PLAN 4
Will restart home med Zyclara Counseling:  I discussed with the patient the risks of imiquimod including but not limited to erythema, scaling, itching, weeping, crusting, and pain.  Patient understands that the inflammatory response to imiquimod is variable from person to person and was educated regarded proper titration schedule.  If flu-like symptoms develop, patient knows to discontinue the medication and contact us.

## 2023-04-04 ENCOUNTER — APPOINTMENT (OUTPATIENT)
Dept: NEUROLOGY | Facility: CLINIC | Age: 88
End: 2023-04-04

## 2023-04-18 ENCOUNTER — NON-APPOINTMENT (OUTPATIENT)
Age: 88
End: 2023-04-18

## 2023-04-24 ENCOUNTER — APPOINTMENT (OUTPATIENT)
Dept: CARDIOLOGY | Facility: CLINIC | Age: 88
End: 2023-04-24
Payer: MEDICARE

## 2023-04-24 ENCOUNTER — NON-APPOINTMENT (OUTPATIENT)
Age: 88
End: 2023-04-24

## 2023-04-24 VITALS
BODY MASS INDEX: 25.07 KG/M2 | HEIGHT: 66 IN | HEART RATE: 77 BPM | WEIGHT: 156 LBS | DIASTOLIC BLOOD PRESSURE: 60 MMHG | OXYGEN SATURATION: 94 % | SYSTOLIC BLOOD PRESSURE: 104 MMHG

## 2023-04-24 DIAGNOSIS — I44.2 ATRIOVENTRICULAR BLOCK, COMPLETE: ICD-10-CM

## 2023-04-24 PROCEDURE — 93000 ELECTROCARDIOGRAM COMPLETE: CPT

## 2023-04-24 PROCEDURE — 99214 OFFICE O/P EST MOD 30 MIN: CPT

## 2023-04-24 NOTE — PHYSICAL EXAM
[Normal] : moves all extremities, no focal deficits, normal speech [de-identified] : No carotid artery bruits auscultated bilaterally [de-identified] : 2/6 systolic ejection murmur RUSB

## 2023-04-24 NOTE — HISTORY OF PRESENT ILLNESS
[FreeTextEntry1] : Historical Perspective:\par 86M w/ PMH of HTN, HLD, BPH, basal cell carcinoma of the nose presents after recent NSTEMI and PCI to his RCA. Presented to [facility] after an episode of exertional angina. Ruled in for MI. Diagnostic angiography revealed a severe RCA lesion and the patient was transferred to [facility] for high-risk PCI given location of the lesion. 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 He underwent dual chamber Medtronic PPM insertion at [facility]. No complications.\par \par Current Health Status:\par Patient with no SOB, or palpitations. No hospitalizations since seeing me last. Remains compliant with his medications and reports no adverse effects.

## 2023-05-12 ENCOUNTER — APPOINTMENT (OUTPATIENT)
Dept: CARDIOLOGY | Facility: CLINIC | Age: 88
End: 2023-05-12
Payer: MEDICARE

## 2023-05-12 ENCOUNTER — NON-APPOINTMENT (OUTPATIENT)
Age: 88
End: 2023-05-12

## 2023-05-12 PROCEDURE — 93306 TTE W/DOPPLER COMPLETE: CPT

## 2023-05-31 ENCOUNTER — APPOINTMENT (OUTPATIENT)
Dept: CARDIOLOGY | Facility: CLINIC | Age: 88
End: 2023-05-31
Payer: MEDICARE

## 2023-05-31 VITALS
HEART RATE: 74 BPM | BODY MASS INDEX: 24.91 KG/M2 | SYSTOLIC BLOOD PRESSURE: 130 MMHG | DIASTOLIC BLOOD PRESSURE: 70 MMHG | WEIGHT: 155 LBS | OXYGEN SATURATION: 97 % | HEIGHT: 66 IN

## 2023-05-31 PROCEDURE — 93280 PM DEVICE PROGR EVAL DUAL: CPT

## 2023-05-31 RX ORDER — FLUTICASONE PROPIONATE 50 UG/1
50 SPRAY, METERED NASAL
Qty: 16 | Refills: 0 | Status: DISCONTINUED | COMMUNITY
Start: 2023-04-19 | End: 2023-05-31

## 2023-05-31 RX ORDER — ALLOPURINOL 300 MG/1
300 TABLET ORAL DAILY
Qty: 30 | Refills: 0 | Status: DISCONTINUED | COMMUNITY
End: 2023-05-31

## 2023-05-31 RX ORDER — BENZONATATE 100 MG/1
100 CAPSULE ORAL
Qty: 15 | Refills: 0 | Status: DISCONTINUED | COMMUNITY
Start: 2023-04-19 | End: 2023-05-31

## 2023-08-30 ENCOUNTER — APPOINTMENT (OUTPATIENT)
Dept: CARDIOLOGY | Facility: CLINIC | Age: 88
End: 2023-08-30
Payer: MEDICARE

## 2023-08-30 ENCOUNTER — NON-APPOINTMENT (OUTPATIENT)
Age: 88
End: 2023-08-30

## 2023-08-30 PROCEDURE — 93296 REM INTERROG EVL PM/IDS: CPT

## 2023-08-30 PROCEDURE — 93294 REM INTERROG EVL PM/LDLS PM: CPT

## 2023-09-08 ENCOUNTER — APPOINTMENT (OUTPATIENT)
Dept: CT IMAGING | Facility: CLINIC | Age: 88
End: 2023-09-08
Payer: MEDICARE

## 2023-09-08 PROCEDURE — 74178 CT ABD&PLV WO CNTR FLWD CNTR: CPT

## 2023-10-26 ENCOUNTER — APPOINTMENT (OUTPATIENT)
Dept: CARDIOLOGY | Facility: CLINIC | Age: 88
End: 2023-10-26
Payer: MEDICARE

## 2023-10-26 ENCOUNTER — NON-APPOINTMENT (OUTPATIENT)
Age: 88
End: 2023-10-26

## 2023-10-26 VITALS
HEART RATE: 84 BPM | DIASTOLIC BLOOD PRESSURE: 80 MMHG | WEIGHT: 165 LBS | OXYGEN SATURATION: 97 % | BODY MASS INDEX: 25.9 KG/M2 | SYSTOLIC BLOOD PRESSURE: 132 MMHG | HEIGHT: 67 IN

## 2023-10-26 PROCEDURE — 93242 EXT ECG>48HR<7D RECORDING: CPT

## 2023-10-26 PROCEDURE — 93000 ELECTROCARDIOGRAM COMPLETE: CPT | Mod: 59

## 2023-10-26 PROCEDURE — 99214 OFFICE O/P EST MOD 30 MIN: CPT

## 2023-11-09 PROCEDURE — 93244 EXT ECG>48HR<7D REV&INTERPJ: CPT

## 2023-11-17 ENCOUNTER — OFFICE (OUTPATIENT)
Dept: URBAN - METROPOLITAN AREA CLINIC 38 | Facility: CLINIC | Age: 88
Setting detail: OPHTHALMOLOGY
End: 2023-11-17
Payer: MEDICARE

## 2023-11-17 DIAGNOSIS — H25.12: ICD-10-CM

## 2023-11-17 DIAGNOSIS — H26.491: ICD-10-CM

## 2023-11-17 DIAGNOSIS — L03.213: ICD-10-CM

## 2023-11-17 PROCEDURE — 99203 OFFICE O/P NEW LOW 30 MIN: CPT | Performed by: OPHTHALMOLOGY

## 2023-11-17 ASSESSMENT — CONFRONTATIONAL VISUAL FIELD TEST (CVF)
OS_FINDINGS: FULL
OD_FINDINGS: FULL

## 2023-11-17 ASSESSMENT — LID EXAM ASSESSMENTS: OS_COMMENTS: BLEPHAROCHALASIS WITH HOODING LUL

## 2023-11-29 ENCOUNTER — NON-APPOINTMENT (OUTPATIENT)
Age: 88
End: 2023-11-29

## 2023-11-29 ENCOUNTER — APPOINTMENT (OUTPATIENT)
Dept: CARDIOLOGY | Facility: CLINIC | Age: 88
End: 2023-11-29
Payer: MEDICARE

## 2023-11-29 PROCEDURE — 93294 REM INTERROG EVL PM/LDLS PM: CPT

## 2023-11-29 PROCEDURE — 93296 REM INTERROG EVL PM/IDS: CPT

## 2024-01-18 NOTE — ASU PATIENT PROFILE, ADULT - NSCAFFEINEWITH_GEN_ALL_CORE_SD
No care due was identified.  [facility] Embedded Care Due Messages. Reference number: [id number].   1/18/2024 2:40:46 PM CST   none

## 2024-02-27 ENCOUNTER — NON-APPOINTMENT (OUTPATIENT)
Age: 89
End: 2024-02-27

## 2024-02-28 ENCOUNTER — APPOINTMENT (OUTPATIENT)
Dept: CARDIOLOGY | Facility: CLINIC | Age: 89
End: 2024-02-28
Payer: MEDICARE

## 2024-02-28 PROCEDURE — 93296 REM INTERROG EVL PM/IDS: CPT

## 2024-02-28 PROCEDURE — 93294 REM INTERROG EVL PM/LDLS PM: CPT

## 2024-03-26 ENCOUNTER — NON-APPOINTMENT (OUTPATIENT)
Age: 89
End: 2024-03-26

## 2024-03-26 ENCOUNTER — RESULT CHARGE (OUTPATIENT)
Age: 89
End: 2024-03-26

## 2024-04-19 ENCOUNTER — APPOINTMENT (OUTPATIENT)
Dept: CARDIOLOGY | Facility: CLINIC | Age: 89
End: 2024-04-19
Payer: MEDICARE

## 2024-04-19 VITALS
BODY MASS INDEX: 25.9 KG/M2 | DIASTOLIC BLOOD PRESSURE: 60 MMHG | HEART RATE: 87 BPM | SYSTOLIC BLOOD PRESSURE: 124 MMHG | WEIGHT: 165 LBS | OXYGEN SATURATION: 97 % | HEIGHT: 67 IN

## 2024-04-19 DIAGNOSIS — I35.1 NONRHEUMATIC AORTIC (VALVE) INSUFFICIENCY: ICD-10-CM

## 2024-04-19 DIAGNOSIS — I10 ESSENTIAL (PRIMARY) HYPERTENSION: ICD-10-CM

## 2024-04-19 DIAGNOSIS — I35.0 NONRHEUMATIC AORTIC (VALVE) STENOSIS: ICD-10-CM

## 2024-04-19 DIAGNOSIS — I25.10 ATHEROSCLEROTIC HEART DISEASE OF NATIVE CORONARY ARTERY W/OUT ANGINA PECTORIS: ICD-10-CM

## 2024-04-19 PROCEDURE — 99214 OFFICE O/P EST MOD 30 MIN: CPT

## 2024-04-19 PROCEDURE — 93000 ELECTROCARDIOGRAM COMPLETE: CPT

## 2024-04-19 PROCEDURE — G2211 COMPLEX E/M VISIT ADD ON: CPT

## 2024-04-19 PROCEDURE — 93306 TTE W/DOPPLER COMPLETE: CPT

## 2024-04-19 RX ORDER — ATORVASTATIN CALCIUM 40 MG/1
40 TABLET, FILM COATED ORAL DAILY
Qty: 90 | Refills: 3 | Status: ACTIVE | COMMUNITY
Start: 1900-01-01 | End: 1900-01-01

## 2024-04-19 RX ORDER — EZETIMIBE 10 MG/1
10 TABLET ORAL
Qty: 90 | Refills: 3 | Status: ACTIVE | COMMUNITY
Start: 2021-05-18 | End: 1900-01-01

## 2024-04-19 RX ORDER — AMLODIPINE BESYLATE 5 MG/1
5 TABLET ORAL
Qty: 90 | Refills: 3 | Status: ACTIVE | COMMUNITY
Start: 1900-01-01 | End: 1900-01-01

## 2024-05-30 ENCOUNTER — NON-APPOINTMENT (OUTPATIENT)
Age: 89
End: 2024-05-30

## 2024-05-31 ENCOUNTER — APPOINTMENT (OUTPATIENT)
Dept: CARDIOLOGY | Facility: CLINIC | Age: 89
End: 2024-05-31
Payer: MEDICARE

## 2024-05-31 PROCEDURE — 93296 REM INTERROG EVL PM/IDS: CPT

## 2024-05-31 PROCEDURE — 93294 REM INTERROG EVL PM/LDLS PM: CPT

## 2024-08-30 ENCOUNTER — APPOINTMENT (OUTPATIENT)
Dept: CARDIOLOGY | Facility: CLINIC | Age: 89
End: 2024-08-30
Payer: MEDICARE

## 2024-08-30 ENCOUNTER — NON-APPOINTMENT (OUTPATIENT)
Age: 89
End: 2024-08-30

## 2024-08-30 PROCEDURE — 93296 REM INTERROG EVL PM/IDS: CPT

## 2024-08-30 PROCEDURE — 93294 REM INTERROG EVL PM/LDLS PM: CPT

## 2024-10-16 ENCOUNTER — NON-APPOINTMENT (OUTPATIENT)
Age: 89
End: 2024-10-16

## 2024-10-24 ENCOUNTER — NON-APPOINTMENT (OUTPATIENT)
Age: 89
End: 2024-10-24

## 2024-10-24 ENCOUNTER — APPOINTMENT (OUTPATIENT)
Dept: CARDIOLOGY | Facility: CLINIC | Age: 89
End: 2024-10-24
Payer: MEDICARE

## 2024-10-24 VITALS
DIASTOLIC BLOOD PRESSURE: 62 MMHG | HEIGHT: 67 IN | WEIGHT: 165 LBS | HEART RATE: 96 BPM | BODY MASS INDEX: 25.9 KG/M2 | SYSTOLIC BLOOD PRESSURE: 132 MMHG | OXYGEN SATURATION: 99 %

## 2024-10-24 DIAGNOSIS — I35.1 NONRHEUMATIC AORTIC (VALVE) INSUFFICIENCY: ICD-10-CM

## 2024-10-24 DIAGNOSIS — I35.0 NONRHEUMATIC AORTIC (VALVE) STENOSIS: ICD-10-CM

## 2024-10-24 DIAGNOSIS — I10 ESSENTIAL (PRIMARY) HYPERTENSION: ICD-10-CM

## 2024-10-24 DIAGNOSIS — I25.10 ATHEROSCLEROTIC HEART DISEASE OF NATIVE CORONARY ARTERY W/OUT ANGINA PECTORIS: ICD-10-CM

## 2024-10-24 PROCEDURE — 99214 OFFICE O/P EST MOD 30 MIN: CPT

## 2024-10-24 PROCEDURE — G2211 COMPLEX E/M VISIT ADD ON: CPT

## 2024-10-24 PROCEDURE — 93000 ELECTROCARDIOGRAM COMPLETE: CPT

## 2024-10-24 RX ORDER — DIVALPROEX SODIUM 250 MG/1
250 TABLET, DELAYED RELEASE ORAL
Refills: 0 | Status: ACTIVE | COMMUNITY

## 2024-11-18 ENCOUNTER — RESULT REVIEW (OUTPATIENT)
Age: 89
End: 2024-11-18

## 2024-11-29 ENCOUNTER — NON-APPOINTMENT (OUTPATIENT)
Age: 89
End: 2024-11-29

## 2024-11-29 ENCOUNTER — APPOINTMENT (OUTPATIENT)
Dept: CARDIOLOGY | Facility: CLINIC | Age: 89
End: 2024-11-29
Payer: MEDICARE

## 2024-11-29 PROCEDURE — 93296 REM INTERROG EVL PM/IDS: CPT

## 2024-11-29 PROCEDURE — 93294 REM INTERROG EVL PM/LDLS PM: CPT

## 2025-03-27 ENCOUNTER — APPOINTMENT (OUTPATIENT)
Dept: CARDIOLOGY | Facility: CLINIC | Age: 89
End: 2025-03-27
Payer: MEDICARE

## 2025-03-27 ENCOUNTER — NON-APPOINTMENT (OUTPATIENT)
Age: 89
End: 2025-03-27

## 2025-03-27 PROCEDURE — 93294 REM INTERROG EVL PM/LDLS PM: CPT

## 2025-03-27 PROCEDURE — 93296 REM INTERROG EVL PM/IDS: CPT

## 2025-06-04 NOTE — DISCHARGE NOTE ADULT - NS AS DC FU INST LIST INST
6 months POST RHIZOTOMY CALL    Procedure: Left C3-4, C4-5 Cervical Rhizotomy.  Radiologist(s): Dr. Donavan Haile  Date of Procedure: 12/6/24    The patient was not available by telephone.    Left a message for patient to call IR department back      Svetlana Basurto   no

## 2025-06-26 ENCOUNTER — APPOINTMENT (OUTPATIENT)
Dept: CARDIOLOGY | Facility: CLINIC | Age: 89
End: 2025-06-26